# Patient Record
Sex: FEMALE | Race: WHITE | NOT HISPANIC OR LATINO | Employment: OTHER | URBAN - METROPOLITAN AREA
[De-identification: names, ages, dates, MRNs, and addresses within clinical notes are randomized per-mention and may not be internally consistent; named-entity substitution may affect disease eponyms.]

---

## 2017-09-28 ENCOUNTER — TELEPHONE (OUTPATIENT)
Dept: NEUROLOGY | Facility: MEDICAL CENTER | Age: 62
End: 2017-09-28

## 2017-09-28 NOTE — TELEPHONE ENCOUNTER
Called in 3 months worth of:    levetiracetam (KEPPRA) 500 MG Tab 90 Tab   Sig: Take one tablets PO qam and two tablets po qhs.    To cover patient up until her scheduled follow up appointment 12/20/17.

## 2017-12-06 ENCOUNTER — TELEPHONE (OUTPATIENT)
Dept: NEUROLOGY | Facility: MEDICAL CENTER | Age: 62
End: 2017-12-06

## 2017-12-06 NOTE — TELEPHONE ENCOUNTER
Patient called to report a seizure.      When change was noticed- Tuesday Afternoon   Frequency- 1  Type of seizure- Grand Mal    Current Medications- Keppra 500mg -1000mg      Any Medication changes?- No changes, however patient thinks that she might have been taking 500mg BID instead of the 1000mg at bedtime.     Illness/Fever? n   Menses? n  Stress? Lost dog a week ago  Sleep Deprivation? Going to bed late, like 1 to 2 am and then waking up at 7 am   Driving? no    Caller: Beatrice  Call Back #: [phone]  OK to leave detailed message: n

## 2017-12-20 ENCOUNTER — APPOINTMENT (OUTPATIENT)
Dept: NEUROLOGY | Facility: MEDICAL CENTER | Age: 62
End: 2017-12-20
Payer: MEDICAID

## 2018-01-16 DIAGNOSIS — G40.909 SEIZURE DISORDER (HCC): ICD-10-CM

## 2018-01-16 NOTE — TELEPHONE ENCOUNTER
Was the patient seen in the last year in this department? No patient was last seen 9/13/16    Does patient have an active prescription for medications requested? Yes     Received Request Via: Pharmacy    Next scheduled follow up appointment: 2/6/18

## 2018-01-17 RX ORDER — LEVETIRACETAM 500 MG/1
TABLET ORAL
Qty: 90 TAB | Refills: 0 | Status: SHIPPED | OUTPATIENT
Start: 2018-01-17 | End: 2018-02-06 | Stop reason: SDUPTHER

## 2018-02-06 ENCOUNTER — OFFICE VISIT (OUTPATIENT)
Dept: NEUROLOGY | Facility: MEDICAL CENTER | Age: 63
End: 2018-02-06
Payer: MEDICAID

## 2018-02-06 VITALS
OXYGEN SATURATION: 97 % | TEMPERATURE: 98.2 F | HEIGHT: 65 IN | BODY MASS INDEX: 22.99 KG/M2 | RESPIRATION RATE: 16 BRPM | WEIGHT: 138 LBS | HEART RATE: 89 BPM | SYSTOLIC BLOOD PRESSURE: 128 MMHG | DIASTOLIC BLOOD PRESSURE: 82 MMHG

## 2018-02-06 DIAGNOSIS — R05.9 COUGH: ICD-10-CM

## 2018-02-06 DIAGNOSIS — R07.9 RIGHT-SIDED CHEST PAIN: ICD-10-CM

## 2018-02-06 DIAGNOSIS — G40.909 SEIZURE DISORDER (HCC): ICD-10-CM

## 2018-02-06 PROCEDURE — 99213 OFFICE O/P EST LOW 20 MIN: CPT | Performed by: NURSE PRACTITIONER

## 2018-02-06 RX ORDER — LEVETIRACETAM 500 MG/1
TABLET ORAL
Qty: 90 TAB | Refills: 5 | Status: SHIPPED | OUTPATIENT
Start: 2018-02-06 | End: 2018-08-10 | Stop reason: SDUPTHER

## 2018-02-06 ASSESSMENT — PATIENT HEALTH QUESTIONNAIRE - PHQ9: CLINICAL INTERPRETATION OF PHQ2 SCORE: 0

## 2018-02-06 NOTE — PROGRESS NOTES
"Subjective:      Beatrice Caraballo is a 62 y.o. female who presents with Follow-Up (Seizure disorder)          HPI  Did have a seizure a few months ago and was seen in the ER and diagnosed with pneumonia.  She had no idea that she had a pneumonia at the time of the seizure.  The seizure was \"a good one\".  She did fall and injured her right chest which is still uncomfortable.    Still complaining of right chest discomfort.  Has not see her PCP nor had a follow-up chest xray.  She is asking for pain medication today.    She does smoke marijuana.    Current Outpatient Prescriptions   Medication Sig Dispense Refill   • levetiracetam (KEPPRA) 500 MG Tab Take one tablets PO qam and two tablets po qhs. 90 Tab 0   • phenytoin ER (DILANTIN) 100 MG Cap 3 times a day.     • hydrocodone/acetaminophen (NORCO)  MG Tab Take 1 Tab by mouth every 8 hours as needed. (Patient not taking: Reported on 2/6/2018) 20 Tab 0     No current facility-administered medications for this visit.        Review of Systems   Constitutional: Negative.    HENT: Negative for hearing loss, nosebleeds and sore throat.         No recent head injury.   Eyes: Negative for double vision.        No new loss of vision.   Respiratory: Positive for cough.         No recent lung infections.   Cardiovascular: Negative for chest pain.   Gastrointestinal: Negative for abdominal pain, diarrhea, nausea and vomiting.   Genitourinary: Negative.    Musculoskeletal: Negative.    Skin: Negative.    Neurological: Negative for headaches.   Endo/Heme/Allergies:        No history of endocrine dysfunction.  No new problems.   Psychiatric/Behavioral: Positive for memory loss. Negative for depression. The patient is not nervous/anxious.         No recent mood changes.          Objective:     /82   Pulse 89   Temp 36.8 °C (98.2 °F)   Resp 16   Ht 1.651 m (5' 5\")   Wt 62.6 kg (138 lb)   SpO2 97%   BMI 22.96 kg/m²      Physical Exam   Constitutional: She is oriented to " person, place, and time. She appears well-developed and well-nourished. No distress.   Appears hagard   HENT:   Head: Normocephalic and atraumatic.   Eyes: Pupils are equal, round, and reactive to light.   Neck: Normal range of motion.   Cardiovascular: Normal rate and regular rhythm.    Pulmonary/Chest: Effort normal. No respiratory distress.   Cough     Musculoskeletal: Normal range of motion.   Neurological: She is alert and oriented to person, place, and time. She has normal strength and normal reflexes. No cranial nerve deficit. She exhibits normal muscle tone. Coordination normal.   No observable changes in neurologic status.  See initial new patient examination for details.       Skin: Skin is warm and dry. There is pallor.   Psychiatric: She has a normal mood and affect. Her speech is tangential.               Assessment/Plan:     Seizure disorder, unclear type:  20 year history of sporadic events concerning for seizures and poor management and compliance of AED's.  Last known seizure was in November when also diagnosed with pneumonia.    Has been doing well off of phenytoin and only taking Keppra.  Currently taking LEV 500mg-1000mg.     Obtain REEG to evaluate for subclinical seizures.  Order placed once again.     Needs to take MVI and Calcium 2000mg per day.     Discussed general safety including cooking and water safety.   Request that a family member or friend attend the next appointment with her.     She does not have a rescue or emergent medication.    Return for follow-up after EEG to discuss plan further.       EDUCATION AND COUNSELING:  -Education was provided to the patient and/or family regarding diagnosis and prognosis. The chronic and unpredictable nature of the condition was discussed. There is increased risk for additional events, which may carry potential for significant injuries and death.    -We reviewed the current antiepileptic regimen. Potential side effects of antiepileptics were  discussed at length, including but no limited to: hypersensitivity reactions (rash and others, some of which can be fatal), visual field changes (some of which may be irreversible), glaucoma, diplopia, kidney stones, osteopenia/osteoporosis/bone fractures, hyperthermia/anhydrosis, tremors, ataxia, dizziness, fatigue, increased risk for falls, cardiac arrhythmias/syncope, gastrointestinal (hepatitis, pancreatitis, gastritis, ulcers), gingival hypertrophy, drowsiness, sedation, anxiety/nervousness, increased risk for suicide, increased risk for depression, and psychosis. We reviewed drug-drug interactions and their potential effect on seizure control and medication side effects.    -Patient/family educated on SUDEP (Sudden Death in Epilepsy). Counseling was provided on the importance of strict medication and follow up compliance. The patient understands the risks associated with non-adherence with the medical plan as outlined, including but not limited to an increased risk for breakthrough seizures, which may contribute to injuries, disability, status epilepticus, and even death.    -Counseling was also provided on potential effects of alcohol and other drugs, which may lower seizure threshold and/or affect the metabolism of antiepileptic drugs. I recommend avoidance of alcohol and illegal drugs.  -Recommend proper hydration, regular exercise, proper sleep hygiene (7-8 hrs of overnight sleep, avoid sleep deprivation).    -I have made the patient aware of mandatory reporting required by the law in the State of [place] regarding episodes of seizures, loss of consciousness, and/or alteration of awareness. The patient and family are responsible for reporting events to the DMV, instructions were provided. The patient verbalized understanding and states she has not been driving. Other seizure precautions were discussed at length, including no diving, no skydiving, no unsupervised swimming, no Jacuzzi or bathing in bathtubs.       Pt agrees with plan.

## 2018-02-08 PROBLEM — R05.9 COUGH: Status: ACTIVE | Noted: 2018-02-08

## 2018-02-08 PROBLEM — R07.9 RIGHT-SIDED CHEST PAIN: Status: ACTIVE | Noted: 2018-02-08

## 2018-02-08 ASSESSMENT — ENCOUNTER SYMPTOMS
NAUSEA: 0
CONSTITUTIONAL NEGATIVE: 1
DOUBLE VISION: 0
HEADACHES: 0
VOMITING: 0
MEMORY LOSS: 1
SORE THROAT: 0
ABDOMINAL PAIN: 0
COUGH: 1
MUSCULOSKELETAL NEGATIVE: 1
DIARRHEA: 0
DEPRESSION: 0
NERVOUS/ANXIOUS: 0

## 2018-08-10 DIAGNOSIS — G40.909 SEIZURE DISORDER (HCC): ICD-10-CM

## 2018-08-10 RX ORDER — LEVETIRACETAM 500 MG/1
TABLET ORAL
Qty: 90 TAB | Refills: 5 | Status: SHIPPED | OUTPATIENT
Start: 2018-08-10 | End: 2018-11-04

## 2018-08-10 NOTE — TELEPHONE ENCOUNTER
Was the patient seen in the last year in this department? Yes    Does patient have an active prescription for medications requested? No     Received Request Via: Patient     No next appt, No Show to EEG due to lack of transportation.

## 2018-11-04 ENCOUNTER — HOSPITAL ENCOUNTER (EMERGENCY)
Facility: MEDICAL CENTER | Age: 63
End: 2018-11-05
Attending: EMERGENCY MEDICINE
Payer: MEDICARE

## 2018-11-04 ENCOUNTER — APPOINTMENT (OUTPATIENT)
Dept: RADIOLOGY | Facility: MEDICAL CENTER | Age: 63
End: 2018-11-04
Attending: EMERGENCY MEDICINE
Payer: MEDICARE

## 2018-11-04 VITALS
TEMPERATURE: 98.1 F | WEIGHT: 155 LBS | BODY MASS INDEX: 26.46 KG/M2 | OXYGEN SATURATION: 93 % | RESPIRATION RATE: 16 BRPM | HEART RATE: 80 BPM | HEIGHT: 64 IN

## 2018-11-04 DIAGNOSIS — S00.431A HEMATOMA OF RIGHT EAR, INITIAL ENCOUNTER: ICD-10-CM

## 2018-11-04 DIAGNOSIS — H60.11 CELLULITIS OF RIGHT EAR: ICD-10-CM

## 2018-11-04 LAB
ANION GAP SERPL CALC-SCNC: 7 MMOL/L (ref 0–11.9)
BASOPHILS # BLD AUTO: 1 % (ref 0–1.8)
BASOPHILS # BLD: 0.07 K/UL (ref 0–0.12)
BUN SERPL-MCNC: 15 MG/DL (ref 8–22)
CALCIUM SERPL-MCNC: 8.8 MG/DL (ref 8.5–10.5)
CHLORIDE SERPL-SCNC: 111 MMOL/L (ref 96–112)
CO2 SERPL-SCNC: 24 MMOL/L (ref 20–33)
CREAT SERPL-MCNC: 0.6 MG/DL (ref 0.5–1.4)
EOSINOPHIL # BLD AUTO: 0.2 K/UL (ref 0–0.51)
EOSINOPHIL NFR BLD: 3 % (ref 0–6.9)
ERYTHROCYTE [DISTWIDTH] IN BLOOD BY AUTOMATED COUNT: 50.5 FL (ref 35.9–50)
GLUCOSE SERPL-MCNC: 72 MG/DL (ref 65–99)
HCT VFR BLD AUTO: 43.5 % (ref 37–47)
HGB BLD-MCNC: 14.2 G/DL (ref 12–16)
IMM GRANULOCYTES # BLD AUTO: 0.02 K/UL (ref 0–0.11)
IMM GRANULOCYTES NFR BLD AUTO: 0.3 % (ref 0–0.9)
LYMPHOCYTES # BLD AUTO: 2.56 K/UL (ref 1–4.8)
LYMPHOCYTES NFR BLD: 38.3 % (ref 22–41)
MCH RBC QN AUTO: 33.6 PG (ref 27–33)
MCHC RBC AUTO-ENTMCNC: 32.6 G/DL (ref 33.6–35)
MCV RBC AUTO: 103.1 FL (ref 81.4–97.8)
MONOCYTES # BLD AUTO: 0.59 K/UL (ref 0–0.85)
MONOCYTES NFR BLD AUTO: 8.8 % (ref 0–13.4)
NEUTROPHILS # BLD AUTO: 3.25 K/UL (ref 2–7.15)
NEUTROPHILS NFR BLD: 48.6 % (ref 44–72)
NRBC # BLD AUTO: 0 K/UL
NRBC BLD-RTO: 0 /100 WBC
PLATELET # BLD AUTO: 256 K/UL (ref 164–446)
PMV BLD AUTO: 9.7 FL (ref 9–12.9)
POTASSIUM SERPL-SCNC: 3.9 MMOL/L (ref 3.6–5.5)
RBC # BLD AUTO: 4.22 M/UL (ref 4.2–5.4)
SODIUM SERPL-SCNC: 142 MMOL/L (ref 135–145)
WBC # BLD AUTO: 6.7 K/UL (ref 4.8–10.8)

## 2018-11-04 PROCEDURE — 700117 HCHG RX CONTRAST REV CODE 255: Performed by: EMERGENCY MEDICINE

## 2018-11-04 PROCEDURE — 304561 HCHG STAT O2

## 2018-11-04 PROCEDURE — 99284 EMERGENCY DEPT VISIT MOD MDM: CPT

## 2018-11-04 PROCEDURE — 85025 COMPLETE CBC W/AUTO DIFF WBC: CPT

## 2018-11-04 PROCEDURE — 80048 BASIC METABOLIC PNL TOTAL CA: CPT

## 2018-11-04 PROCEDURE — 36415 COLL VENOUS BLD VENIPUNCTURE: CPT

## 2018-11-04 PROCEDURE — 70487 CT MAXILLOFACIAL W/DYE: CPT

## 2018-11-04 RX ORDER — SULFAMETHOXAZOLE AND TRIMETHOPRIM 800; 160 MG/1; MG/1
1 TABLET ORAL 2 TIMES DAILY
Qty: 14 TAB | Refills: 0 | Status: SHIPPED | OUTPATIENT
Start: 2018-11-04 | End: 2018-11-11

## 2018-11-04 RX ORDER — CEPHALEXIN 250 MG/1
250 CAPSULE ORAL 4 TIMES DAILY
Qty: 28 CAP | Refills: 0 | Status: SHIPPED | OUTPATIENT
Start: 2018-11-04 | End: 2018-11-11

## 2018-11-04 RX ORDER — CARBAMAZEPINE 200 MG/1
200 TABLET ORAL 3 TIMES DAILY
COMMUNITY
End: 2019-03-20

## 2018-11-04 RX ADMIN — IOHEXOL 75 ML: 350 INJECTION, SOLUTION INTRAVENOUS at 23:13

## 2018-11-04 ASSESSMENT — PAIN SCALES - GENERAL: PAINLEVEL_OUTOF10: 2

## 2018-11-04 ASSESSMENT — LIFESTYLE VARIABLES: DO YOU DRINK ALCOHOL: YES

## 2018-11-05 NOTE — ED PROVIDER NOTES
ED Provider Note    Scribed for Smita Brower M.D. by Singh Love. 11/4/2018, 8:54 PM.    Primary care provider: Akash Vilchis M.D.  Means of arrival: Ambulance  History obtained from: Patient  History limited by: None    CHIEF COMPLAINT  Chief Complaint   Patient presents with   • Foreign Body in Ear     Pt reports having a seizure about 4 days ago, leading to her hitting her ear. Pt noticed blood coming out of her ear. In an effort to clean the blood, the patient used peroxide and a Qtip. She states her ear started going numb and in an effort to remove the Qtip, she hit and lodged the qtip in her ear.    • Sent by MD     Pt transferred from [facility] in [place] for evaluation       HPI  Beatrice Caraballo is a 63 y.o. female who presents to the Emergency Department for infection of the right ear and possible foreign body as a transfer. The patient had a seizure 4 days ago and she hit her ear on a garage lid, leading to an ear laceration. She did not follow up with anyone for laceration repair, but she washed it out. The patient was then cleaning out her ear with hydrogen peroxide and used a Q-Tip, when she accidentally hit the Q-Tip  -she believes there is concerned that it actually went in to her ear. She reports that she did not pull the Q-Tip out and it is still in there. She was evaluated at another hospital and the physician could not visualize the foreign body. She was transferred for cellulitis of the ear that may need to be drained. She was given Vancomycin and Ancef prior to transfer and no imaging was performed. She denies any fevers, nausea, and emesis. The patient too, Aspirin this morning.    REVIEW OF SYSTEMS  Positives as above. Pertinent negatives include fevers, nausea, and emesis.   All other review of systems are negative    PAST MEDICAL HISTORY   has a past medical history of Seizure (HCC) and Seizure (HCC).    SURGICAL HISTORY   has a past surgical history that includes athroplasty.    SOCIAL  "HISTORY  Social History   Substance Use Topics   • Smoking status: Current Every Day Smoker     Packs/day: 0.25     Types: Cigarettes   • Smokeless tobacco: Never Used      Comment: Patient states a pack lasts her 3 to 4 days       FAMILY HISTORY  Family History   Problem Relation Age of Onset   • Cancer Sister        CURRENT MEDICATIONS  Reviewed. See Encounter Summary.     ALLERGIES  No Known Allergies    PHYSICAL EXAM  VITAL SIGNS: Temp 36.7 °C (98.1 °F)   Ht 1.626 m (5' 4\")   Wt 70.3 kg (155 lb)   BMI 26.61 kg/m²    Pulse ox interpretation: I interpret this pulse ox as normal.  Constitutional: Alert in no apparent distress.  HENT: Normocephalic, MMM, Right ear antihelix and inner part is swollen and eyrthematous with hematoma, 2 lacerations on the lower inner part on cartilage near pinna that come together in T that are 2 and 3.5 cm, her TM is erythematous and bulging with fluid, canal swollen, no mastoid tend no evidence of foreign body  Eyes: PERR, Conjunctiva normal, Non-icteric.   Neck: Normal range of motion, No tenderness, Supple, No stridor.   Lymphatic: No lymphadenopathy noted.   Cardiovascular: Regular rate and rhythm, no murmurs.   Thorax & Lungs: Normal breath sounds, No respiratory distress, No wheezing, No chest tenderness.   Abdomen: Bowel sounds normal, Soft, No tenderness, No pulsatile masses. No peritoneal signs.  Skin: Warm, Dry, No erythema, No rash.   Back: No bony tenderness, No CVA tenderness.   Extremities: Intact distal pulses, No edema, No tenderness, No cyanosis  Musculoskeletal: Good range of motion in all major joints. No tenderness to palpation or major deformities noted.   Neurologic: Alert and oriented, No focal deficits noted.     DIFFERENTIAL DIAGNOSIS AND WORK UP PLAN    8:54 PM Patient seen and examined at bedside. The patient presents with foreign body in right ear and the differential diagnosis includes but is not limited to Ear cellulitis with underlying hematoma, otitis " "media with possible otitis external. Ordered for CBC and BMP to evaluate. I informed the patient that I will consult ENT and she will likely undergo a CT scan.    DIAGNOSTIC STUDIES / PROCEDURES     LABS  CBC BMP within normal limits  All labs were reviewed by me.\    RADIOLOGY  CT-MAXILLOFACIAL WITH PLUS RECONS   Final Result      1.  Soft tissue edema involving the right ear. No abscess is identified.      2.  No mastoid effusion or bony destruction to suggest mastoiditis.        The radiologist's interpretation of all radiological studies have been reviewed by me.    COURSE & MEDICAL DECISION MAKING  Pertinent Labs & Imaging studies reviewed. (See chart for details)    9:03 PM I reviewed the patient's outside records and it was found as follows: CBC 6 without a left shift, CMP normal, updated on tetanus, and given Vancomycin and Ancef.    9:10 ENT paged.    9:43 PM ENT re-paged.    10:09 PM Dr. Fuller, ENT, consulted and informe [facility] that she looks fine and needs follow up. He agrees and would like her placed on antibiotics, along with follow up.     10:14 PM I reevaluated the patient and she feels like there is something there, so she will undergo a CT scan.    10:15 PM CT-Maxillofacial ordered for evaluation.    11:49 PM I reevaluated the patient and she is doing well. I informed her of her imaging results and that she will be discharged home.  Patient will be started on something to cover MRSA as discussed with Dr. Fuller.  She will be sent home with Bactrim and Keflex and continue to take the antibiotics until they are completed she will follow-up with ENT later this week and she will call their office this morning.  The patient is advised to follow up with ENT and to return for further evaluation should the patent's symptoms worsen. The patient understands and agrees to discharge home.    131/73  Pulse 79   Temp 36.7 °C (98.1 °F)   Resp 14   Ht 1.626 m (5' 4\")   Wt 70.3 kg (155 lb)   SpO2 94%   BMI " 26.61 kg/m²      The patient will return for new or worsening symptoms and is stable at the time of discharge.    DISPOSITION:  Patient will be discharged home in stable condition.    FOLLOW UP:  Akash Vilchis M.D.  [address] [facility] [address]  [phone]    Schedule an appointment as soon as possible for a visit       [facility], Emergency Dept  [address] [facility] [address] [address] [phone]    If symptoms worsen    Sen Fuller M.D.  [address] [facility] [address]  [phone]    Schedule an appointment as soon as possible for a visit   CALL TODAY TO SCHEDULE A FOLLOW UP APPT      OUTPATIENT MEDICATIONS:  New Prescriptions    CEPHALEXIN (KEFLEX) 250 MG CAP    Take 1 Cap by mouth 4 times a day for 7 days.    SULFAMETHOXAZOLE-TRIMETHOPRIM (BACTRIM DS) 800-160 MG TABLET    Take 1 Tab by mouth 2 times a day for 7 days.         FINAL IMPRESSION  1. Cellulitis of right ear    2. Hematoma of right ear, initial encounter          Singh ANG (Scribe), am scribing for, and in the presence of, Smita Brower M.D..    Electronically signed by: Singh Love ([place]), 11/4/2018    ISmita M.D. personally performed the services described in this documentation, as scribed by Singh Love in my presence, and it is both accurate and complete. C    The note accurately reflects work and decisions made by me.  Smita Brower  11/5/2018  12:59 AM    This dictation has been created using voice recognition software and/or scribes. The accuracy of the dictation is limited by the abilities of the software and the expertise of the scribes. I expect there may be some errors of grammar and possibly content. I made every attempt to manually correct the errors within my dictation. However, errors related to voice recognition software and/or scribes may still exist and should be interpreted within the appropriate context.

## 2018-11-05 NOTE — ED TRIAGE NOTES
"Beatrice Caraballo  63 y.o. Female  Chief Complaint   Patient presents with   • Foreign Body in Ear     Pt reports having a seizure about 4 days ago, leading to her hitting her ear. Pt noticed blood coming out of her ear. In an effort to clean the blood, the patient used peroxide and a Qtip. She states her ear started going numb and in an effort to remove the Qtip, she hit and lodged the qtip in her ear.    • Sent by MD     Pt transferred from [facility] for evaluation     Pt transferred from [facility] by EMS. Pt given Vancomycin, ancef, 4mg Zofran, 4mg Morphine, and Toradol at outlying facility.   Pt is alert and oriented, speaking in full sentences, follows commands and responds appropriately to questions. NAD. Resp are even and unlabored.     Hx: Tobacco abuse, Seizure    NIBP: 120/82, NIBP MAP (Calculated): 113, Heart Rate (Monitored): 80, Pulse: 88, Respiration: 18, Temperature: 36.7 °C (98.1 °F), Height: 162.6 cm (5' 4\"), Weight: 70.3 kg (155 lb), BMI (Calculated): 26.61, BSA (Calculated): 1.8, Pulse Oximetry: 90 %, O2 (LPM): 0, O2 Delivery: None (Room Air)  "

## 2018-11-05 NOTE — ED NOTES
D/C instructions provided to patient at bedside, verbalizes understanding and states plans for follow-up with otolaryngology as recommended.   Scripts given  IV removed and dressed.   All belongings accounted for, all questions answered at this time.   Pt provided MTM information and assisted to lobby.

## 2019-01-29 ENCOUNTER — TELEPHONE (OUTPATIENT)
Dept: NEUROLOGY | Facility: MEDICAL CENTER | Age: 64
End: 2019-01-29

## 2019-01-31 ENCOUNTER — TELEPHONE (OUTPATIENT)
Dept: NEUROLOGY | Facility: MEDICAL CENTER | Age: 64
End: 2019-01-31

## 2019-01-31 DIAGNOSIS — G40.909 SEIZURE DISORDER (HCC): ICD-10-CM

## 2019-01-31 NOTE — TELEPHONE ENCOUNTER
Pt called and let me know that she would like to be referred to [facility] because it is closer to her home and her  is now going there, she thinks it will be easier for her to go there instead. I let pt know that I will call her back if Elda approved to refer her of if she wants pt to obtain from PCP.

## 2019-03-19 ENCOUNTER — OFFICE VISIT (OUTPATIENT)
Dept: NEUROLOGY | Facility: MEDICAL CENTER | Age: 64
End: 2019-03-19
Payer: MEDICARE

## 2019-03-19 VITALS
HEIGHT: 64 IN | OXYGEN SATURATION: 94 % | HEART RATE: 87 BPM | WEIGHT: 162.4 LBS | TEMPERATURE: 97.9 F | BODY MASS INDEX: 27.72 KG/M2 | SYSTOLIC BLOOD PRESSURE: 122 MMHG | DIASTOLIC BLOOD PRESSURE: 66 MMHG

## 2019-03-19 DIAGNOSIS — G40.909 SEIZURE DISORDER (HCC): ICD-10-CM

## 2019-03-19 DIAGNOSIS — S09.93XA TONGUE INJURY, INITIAL ENCOUNTER: ICD-10-CM

## 2019-03-19 PROCEDURE — 99213 OFFICE O/P EST LOW 20 MIN: CPT | Performed by: NURSE PRACTITIONER

## 2019-03-19 RX ORDER — LEVETIRACETAM 500 MG/1
TABLET ORAL
Qty: 120 TAB | Refills: 5 | Status: SHIPPED | OUTPATIENT
Start: 2019-03-19 | End: 2019-09-06 | Stop reason: SDUPTHER

## 2019-03-19 ASSESSMENT — PATIENT HEALTH QUESTIONNAIRE - PHQ9: CLINICAL INTERPRETATION OF PHQ2 SCORE: 0

## 2019-03-19 NOTE — PROGRESS NOTES
Subjective:      Beatrice Caraballo is a 64 y.o. female who presents with No chief complaint on file.          HPI Did have a seizure on Saturday night during her sleep.  She awoke with extreme leg soreness, having bit both sides of her tongue and post-ictal fatigue.  Her tongue is very injured.    Intercurrently, she fell and hit her right ear requiring a trip to the ED.    Ran out of Keppra about 5-6 days ago and this is attributed to her having the seizure on Saturday.     She does smoke marijuana.    Current Outpatient Prescriptions   Medication Sig Dispense Refill   • levETIRAcetam (KEPPRA) 500 MG Tab TAKE ONE TABLET BY MOUTH IN THE MORNING AND 2 TABLETS BY MOUTH AT BEDTIME 45 Tab 0   • carBAMazepine (TEGRETOL) 200 MG Tab Take 200 mg by mouth 3 times a day.       No current facility-administered medications for this visit.          Review of Systems   HENT: Negative for hearing loss, nosebleeds and sore throat.         No recent head injury.   Eyes: Negative for double vision.        No new loss of vision.   Respiratory: Positive for cough ( tongue injury).         No recent lung infections.   Cardiovascular: Negative for chest pain.   Gastrointestinal: Negative for abdominal pain, diarrhea, nausea and vomiting.   Genitourinary: Negative.    Musculoskeletal: Negative.    Skin: Negative.    Neurological: Positive for seizures. Negative for headaches.   Endo/Heme/Allergies:        No history of endocrine dysfunction.  No new problems.   Psychiatric/Behavioral: Positive for memory loss. Negative for depression. The patient is not nervous/anxious.         No recent mood changes.          Objective:     There were no vitals taken for this visit.     Physical Exam   Constitutional: She is oriented to person, place, and time. She appears well-developed and well-nourished.   Appears tired, difficult time talking due to the tongue injury   HENT:   Head: Normocephalic and atraumatic.   Eyes: EOM are normal.   Neck: Normal range  of motion.   Cardiovascular: Normal rate and regular rhythm.    Pulmonary/Chest: Effort normal.   Neurological: She is alert and oriented to person, place, and time. She exhibits normal muscle tone. Gait normal.   No observable changes in neurologic status.  See initial new patient examination for details.    Skin: Skin is warm. There is pallor.   Psychiatric: She has a normal mood and affect.             Assessment/Plan:     Seizure disorder, unclear type:  20 year history of sporadic events concerning for seizures and poor management and compliance of AED's.  Last known seizure was on Saturday.  She has been out of Keppra for 5-6 days.     Has been doing well off of phenytoin and only taking Keppra.  Currently taking LEV 500mg-1000mg.  Will submit a new Rx for titration of Keppra to 1000mg BID.     Obtain REEG to evaluate for subclinical seizures.  Order placed once again.     Needs to take MVI and Calcium 2000mg per day.     Discussed general safety including cooking and water safety.   Request that a family member or friend attend the next appointment with her.     She does not have a rescue or emergent medication.     New Rx for BMX mouth wash provided.    Return for follow-up after EEG to discuss plan further.        EDUCATION AND COUNSELING:  -Education was provided to the patient and/or family regarding diagnosis and prognosis. The chronic and unpredictable nature of the condition was discussed. There is increased risk for additional events, which may carry potential for significant injuries and death.    -We reviewed the current antiepileptic regimen. Potential side effects of antiepileptics were discussed at length, including but no limited to: hypersensitivity reactions (rash and others, some of which can be fatal), visual field changes (some of which may be irreversible), glaucoma, diplopia, kidney stones, osteopenia/osteoporosis/bone fractures, hyperthermia/anhydrosis, tremors, ataxia, dizziness,  fatigue, increased risk for falls, cardiac arrhythmias/syncope, gastrointestinal (hepatitis, pancreatitis, gastritis, ulcers), gingival hypertrophy, drowsiness, sedation, anxiety/nervousness, increased risk for suicide, increased risk for depression, and psychosis. We reviewed drug-drug interactions and their potential effect on seizure control and medication side effects.    -Patient/family educated on SUDEP (Sudden Death in Epilepsy). Counseling was provided on the importance of strict medication and follow up compliance. The patient understands the risks associated with non-adherence with the medical plan as outlined, including but not limited to an increased risk for breakthrough seizures, which may contribute to injuries, disability, status epilepticus, and even death.    -Counseling was also provided on potential effects of alcohol and other drugs, which may lower seizure threshold and/or affect the metabolism of antiepileptic drugs. I recommend avoidance of alcohol and illegal drugs.  -Recommend proper hydration, regular exercise, proper sleep hygiene (7-8 hrs of overnight sleep, avoid sleep deprivation).    -I have made the patient aware of mandatory reporting required by the law in the [place] regarding episodes of seizures, loss of consciousness, and/or alteration of awareness. The patient and family are responsible for reporting events to the DMV, instructions were provided. The patient verbalized understanding and states she has not been driving. Other seizure precautions were discussed at length, including no diving, no skydiving, no unsupervised swimming, no Jacuzzi or bathing in bathtubs.       Pt agrees with plan.

## 2019-03-20 ASSESSMENT — ENCOUNTER SYMPTOMS
DIARRHEA: 0
DOUBLE VISION: 0
ABDOMINAL PAIN: 0
NAUSEA: 0
MEMORY LOSS: 1
COUGH: 1
VOMITING: 0
HEADACHES: 0
NERVOUS/ANXIOUS: 0
SEIZURES: 1
DEPRESSION: 0
MUSCULOSKELETAL NEGATIVE: 1
SORE THROAT: 0

## 2019-08-07 ENCOUNTER — NON-PROVIDER VISIT (OUTPATIENT)
Dept: NEUROLOGY | Facility: MEDICAL CENTER | Age: 64
End: 2019-08-07
Payer: MEDICARE

## 2019-08-07 DIAGNOSIS — G40.909 SEIZURE DISORDER (HCC): ICD-10-CM

## 2019-08-07 PROCEDURE — 95816 EEG AWAKE AND DROWSY: CPT | Performed by: PSYCHIATRY & NEUROLOGY

## 2019-08-07 NOTE — PROCEDURES
ROUTINE ELECTROENCEPHALOGRAM REPORT      Referring provider: FRANCO Paez.    DOS: 8/7/2019 (total recording of 27 minutes)     INDICATION:  Beatrice Caraballo 64 y.o. female presenting with seizures.     CURRENT ANTIEPILEPTIC REGIMEN: Levetiracetam.    TECHNIQUE: 30 channel routine electroencephalogram (EEG) was performed in accordance with the international 10-20 system. The study was reviewed in bipolar and referential montages. The recording examined the patient during wakeful and drowsy state(s).     DESCRIPTION OF THE RECORD:  During the wakefulness, the background showed a symmetrical 9 hz alpha activity posteriorly with amplitude of 70 mV.  There was reactivity to eye closure/opening.  A normal anterior-posterior gradient was noted with faster beta frequencies seen anteriorly.  During drowsiness, thetafrequencies were seen.    ACTIVATION PROCEDURES:   Hyperventilation was performed by the patient for a total of 3 minutes. The technician performing the test noted good effort. This technique failed to produce any significant electroencephalographic changes.     Intermittent Photic stimulation was performed in a stepwise fashion from 1 to 30 Hz and elicited a normal response (photic driving), most noticeable in the posterior leads.      ICTAL AND/OR INTERICTAL FINDINGS:   A single burst of generalized, 4 to 5 Hz spike and slow-wave complexes was captured during the study.  This was about 5 seconds in duration.  No clinical events or seizures were reported or recorded during the study.     EKG: sampling of the EKG recording demonstrated sinus rhythm.       INTERPRETATION:  This is an abnormal routine EEG recording in the awake and drowsy states. A single burst of generalized, 4 to 5 Hz spike and slow-wave complexes was captured during the study.  This was about 5 seconds in duration.  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  Clinical correlation  is recommended.      Eulogio Valles MD   Epilepsy and Neurodiagnostics.   Clinical  of Neurology [facility].   Diplomate in Neurology, Epilepsy, and Electrodiagnostic Medicine.   Office: [phone]  Fax: [phone]

## 2019-08-13 ENCOUNTER — TELEPHONE (OUTPATIENT)
Dept: NEUROLOGY | Facility: MEDICAL CENTER | Age: 64
End: 2019-08-13

## 2019-08-13 NOTE — TELEPHONE ENCOUNTER
I just reviewed her EEG and I'd like to talk to her about it in an upcoming appt.  I ordered labs to be collected-- can we please remind her to have a lab draw, fasting and trough?

## 2019-08-16 NOTE — TELEPHONE ENCOUNTER
Called pt and LVM advising results and asking for labs to be done fasting and trough. Advised to call back with questions or concerns.

## 2019-09-23 ENCOUNTER — OFFICE VISIT (OUTPATIENT)
Dept: NEUROLOGY | Facility: MEDICAL CENTER | Age: 64
End: 2019-09-23
Payer: MEDICARE

## 2019-09-23 VITALS
HEIGHT: 64 IN | SYSTOLIC BLOOD PRESSURE: 124 MMHG | OXYGEN SATURATION: 98 % | BODY MASS INDEX: 28.17 KG/M2 | RESPIRATION RATE: 16 BRPM | TEMPERATURE: 97.2 F | DIASTOLIC BLOOD PRESSURE: 70 MMHG | WEIGHT: 165 LBS | HEART RATE: 75 BPM

## 2019-09-23 DIAGNOSIS — G40.909 SEIZURE DISORDER (HCC): ICD-10-CM

## 2019-09-23 PROCEDURE — 99213 OFFICE O/P EST LOW 20 MIN: CPT | Performed by: NURSE PRACTITIONER

## 2019-09-23 RX ORDER — LEVETIRACETAM 500 MG/1
TABLET ORAL
Qty: 360 TAB | Refills: 1 | Status: SHIPPED | OUTPATIENT
Start: 2019-09-23 | End: 2020-04-07

## 2019-09-23 ASSESSMENT — ENCOUNTER SYMPTOMS
MUSCULOSKELETAL NEGATIVE: 1
MEMORY LOSS: 1
NAUSEA: 0
DEPRESSION: 0
ABDOMINAL PAIN: 0
CONSTITUTIONAL NEGATIVE: 1
VOMITING: 0
SORE THROAT: 0
DIARRHEA: 0
SEIZURES: 0
COUGH: 0
NERVOUS/ANXIOUS: 0
DOUBLE VISION: 0
HEADACHES: 0

## 2019-09-23 NOTE — PROGRESS NOTES
Subjective:      Beatrice Caraballo is a 64 y.o. female who presents with Follow-Up (Seizure disorder )            HPI  She increased the Keppra from 500-1000mg to 1000mg BID.  She has not had another seizure.  Reports that she is feeling well and thinks that this is the right dose for her.    Tried and failed dilantin in years past.    Has had a breakthrough seizure when skipping 5-6 days of Keppra last year.    Her  has been in a rehab facility for 6 months now which has been very taxing for them.  She is by herself most of the time.      8/7/2019 EEG ICTAL AND/OR INTERICTAL FINDINGS:   A single burst of generalized, 4 to 5 Hz spike and slow-wave   complexes was captured during the study.  This was about 5   seconds in duration.  No clinical events or seizures were   reported or recorded during the study.     She does smoke marijuana.  Current Outpatient Medications   Medication Sig Dispense Refill   • levETIRAcetam (KEPPRA) 500 MG Tab TAKE TWO TABLETS BY MOUTH TWO TIMES A  Tab 0   • mag hydrox-al hydrox-simeth-diphenhydrAMINE-lidocaine viscous 2% Take 30 mL by mouth 4 times a day as needed. 250 mL 1     No current facility-administered medications for this visit.        Review of Systems   Constitutional: Negative.    HENT: Negative for hearing loss, nosebleeds and sore throat.         No recent head injury.   Eyes: Negative for double vision.        No new loss of vision.   Respiratory: Negative for cough.         No recent lung infections.   Cardiovascular: Negative for chest pain.   Gastrointestinal: Negative for abdominal pain, diarrhea, nausea and vomiting.   Genitourinary: Negative.    Musculoskeletal: Negative.    Skin: Negative.    Neurological: Negative for seizures and headaches.   Endo/Heme/Allergies:        No history of endocrine dysfunction.  No new problems.   Psychiatric/Behavioral: Positive for memory loss. Negative for depression. The patient is not nervous/anxious.         No recent mood  "changes.          Objective:     /70 (BP Location: Left arm, Patient Position: Sitting, BP Cuff Size: Adult)   Pulse 75   Temp 36.2 °C (97.2 °F) (Temporal)   Resp 16   Ht 1.626 m (5' 4.02\")   Wt 74.8 kg (165 lb)   SpO2 98%   BMI 28.31 kg/m²      Physical Exam   Constitutional: She is oriented to person, place, and time. She appears well-developed and well-nourished. No distress.   HENT:   Head: Normocephalic and atraumatic.   Right lower tooth is displaced and at an awkward position.     Eyes: EOM are normal.   Neck: Normal range of motion.   Cardiovascular: Normal rate and regular rhythm.   Pulmonary/Chest: Effort normal and breath sounds normal. No respiratory distress.   Musculoskeletal: Normal range of motion.   Neurological: She is alert and oriented to person, place, and time. She has normal strength and normal reflexes. No cranial nerve deficit. She exhibits normal muscle tone. Coordination normal.   No observable changes in neurologic status.  See initial new patient examination for details.     Skin: Skin is warm and dry.           Assessment/Plan:     Seizure disorder, unclear type:  20 year history of sporadic events concerning for seizures and poor management and compliance of AED's.  Last known seizure was mid-March 2019.    Increased Keppra to 1000mg BID and reports no seizures since that time.    8/7/2019 EEG ICTAL AND/OR INTERICTAL FINDINGS:   A single burst of generalized, 4 to 5 Hz spike and slow-wave   complexes was captured during the study.  This was about 5   seconds in duration.  No clinical events or seizures were   reported or recorded during the study.      Continue leviteracetam 1000mg BID.     Needs to take MVI and Calcium 2000mg per day.     Discussed general safety including cooking and water safety.   Request that a family member or friend attend the next appointment with her.     She does not have a rescue or emergent medication.      Return for follow-up in 4 months per " request as she has a long drive.        EDUCATION AND COUNSELING:  -Education was provided to the patient and/or family regarding diagnosis and prognosis. The chronic and unpredictable nature of the condition was discussed. There is increased risk for additional events, which may carry potential for significant injuries and death.    -We reviewed the current antiepileptic regimen. Potential side effects of antiepileptics were discussed at length, including but no limited to: hypersensitivity reactions (rash and others, some of which can be fatal), visual field changes (some of which may be irreversible), glaucoma, diplopia, kidney stones, osteopenia/osteoporosis/bone fractures, hyperthermia/anhydrosis, tremors, ataxia, dizziness, fatigue, increased risk for falls, cardiac arrhythmias/syncope, gastrointestinal (hepatitis, pancreatitis, gastritis, ulcers), gingival hypertrophy, drowsiness, sedation, anxiety/nervousness, increased risk for suicide, increased risk for depression, and psychosis. We reviewed drug-drug interactions and their potential effect on seizure control and medication side effects.    -Patient/family educated on SUDEP (Sudden Death in Epilepsy). Counseling was provided on the importance of strict medication and follow up compliance. The patient understands the risks associated with non-adherence with the medical plan as outlined, including but not limited to an increased risk for breakthrough seizures, which may contribute to injuries, disability, status epilepticus, and even death.    -Counseling was also provided on potential effects of alcohol and other drugs, which may lower seizure threshold and/or affect the metabolism of antiepileptic drugs. I recommend avoidance of alcohol and illegal drugs.  -Recommend proper hydration, regular exercise, proper sleep hygiene (7-8 hrs of overnight sleep, avoid sleep deprivation).    -I have made the patient aware of mandatory reporting required by the law in  the [place] of [place] regarding episodes of seizures, loss of consciousness, and/or alteration of awareness. The patient and family are responsible for reporting events to the DMV, instructions were provided. The patient verbalized understanding and states she has not been driving. Other seizure precautions were discussed at length, including no diving, no skydiving, no unsupervised swimming, no Jacuzzi or bathing in bathtubs.       Pt agrees with plan.

## 2020-04-07 ENCOUNTER — TELEPHONE (OUTPATIENT)
Dept: NEUROLOGY | Facility: MEDICAL CENTER | Age: 65
End: 2020-04-07

## 2020-07-14 ENCOUNTER — TELEPHONE (OUTPATIENT)
Dept: NEUROLOGY | Facility: MEDICAL CENTER | Age: 65
End: 2020-07-14

## 2020-09-06 ENCOUNTER — OFFICE VISIT (OUTPATIENT)
Dept: URGENT CARE | Facility: PHYSICIAN GROUP | Age: 65
End: 2020-09-06
Payer: MEDICARE

## 2020-09-06 ENCOUNTER — APPOINTMENT (OUTPATIENT)
Dept: RADIOLOGY | Facility: IMAGING CENTER | Age: 65
End: 2020-09-06
Attending: PHYSICIAN ASSISTANT
Payer: MEDICARE

## 2020-09-06 VITALS
RESPIRATION RATE: 16 BRPM | WEIGHT: 159 LBS | DIASTOLIC BLOOD PRESSURE: 82 MMHG | TEMPERATURE: 97.3 F | OXYGEN SATURATION: 97 % | HEIGHT: 66 IN | BODY MASS INDEX: 25.55 KG/M2 | HEART RATE: 94 BPM | SYSTOLIC BLOOD PRESSURE: 122 MMHG

## 2020-09-06 DIAGNOSIS — S63.501A SPRAIN OF RIGHT WRIST, INITIAL ENCOUNTER: ICD-10-CM
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
## 2020-09-06 DIAGNOSIS — S60.221A CONTUSION OF RIGHT HAND, INITIAL ENCOUNTER: ICD-10-CM

## 2020-09-06 DIAGNOSIS — L08.9 SKIN INFECTION: ICD-10-CM

## 2020-09-06 PROCEDURE — 73130 X-RAY EXAM OF HAND: CPT | Mod: TC,FY,RT | Performed by: PHYSICIAN ASSISTANT

## 2020-09-06 PROCEDURE — 73090 X-RAY EXAM OF FOREARM: CPT | Mod: TC,FY,RT | Performed by: PHYSICIAN ASSISTANT

## 2020-09-06 PROCEDURE — 99204 OFFICE O/P NEW MOD 45 MIN: CPT | Performed by: PHYSICIAN ASSISTANT

## 2020-09-06 RX ORDER — DOXYCYCLINE HYCLATE 100 MG
100 TABLET ORAL 2 TIMES DAILY
Qty: 14 TAB | Refills: 0 | Status: SHIPPED | OUTPATIENT
Start: 2020-09-06 | End: 2021-05-17

## 2020-09-06 RX ORDER — HYDROCODONE BITARTRATE AND ACETAMINOPHEN 5; 325 MG/1; MG/1
1 TABLET ORAL EVERY 6 HOURS PRN
Qty: 12 TAB | Refills: 0 | Status: SHIPPED | OUTPATIENT
Start: 2020-09-06 | End: 2020-09-09

## 2020-09-06 ASSESSMENT — PAIN SCALES - GENERAL: PAINLEVEL: 10=SEVERE PAIN

## 2020-09-06 NOTE — PROGRESS NOTES
"Chief Complaint   Patient presents with   • Hand Injury     fell and pt thinks she may have re-broke her arm       HISTORY OF PRESENT ILLNESS: Patient is a 65 y.o. female who presents today for the following:    Patient comes in for evaluation of a ground-level fall last night, tripping over a shop back at home.  She complains of right hand and wrist pain.  She has a history of a fracture in that wrist 2 years ago.  She did take some ibuprofen last night.  She denies distal paresthesias.    Patient Active Problem List    Diagnosis Date Noted   • Right-sided chest pain 2018   • Cough 2018   • Seizure disorder (HCC) 2016       Allergies:Patient has no known allergies.    Current Outpatient Medications Ordered in Epic   Medication Sig Dispense Refill   • doxycycline (VIBRAMYCIN) 100 MG Tab Take 1 Tab by mouth 2 times a day. 14 Tab 0   • HYDROcodone-acetaminophen (NORCO) 5-325 MG Tab per tablet Take 1 Tab by mouth every 6 hours as needed for up to 3 days. 12 Tab 0   • levETIRAcetam (KEPPRA) 500 MG Tab TAKE TWO TABLETS BY MOUTH TWO TIMES A  Tab 0   • mag hydrox-al hydrox-simeth-diphenhydrAMINE-lidocaine viscous 2% Take 30 mL by mouth 4 times a day as needed. 250 mL 1     No current [facility]-ordered facility-administered medications on file.        Past Medical History:   Diagnosis Date   • Seizure (HCC)    • Seizure (HCC)        Social History     Tobacco Use   • Smoking status: Current Every Day Smoker     Packs/day: 0.25     Types: Cigarettes   • Smokeless tobacco: Never Used   • Tobacco comment: Patient states a pack lasts her 3 to 4 days   Substance Use Topics   • Alcohol use: Yes     Comment: \"1-2 drinks/month\"   • Drug use: No       Family Status   Relation Name Status   • Mo  Alive   • Fa     • Sis     • Bro       Family History   Problem Relation Age of Onset   • Cancer Sister        Review of Systems:    Constitutional ROS: No unexpected change in weight, No weakness, " "No fatigue  Pulmonary ROS: No chronic cough, sputum, or hemoptysis, No dyspnea on exertion, No wheezing  Cardiovascular ROS: No diaphoresis, No edema, No palpitations  Musculoskeletal/Extremities ROS: Right hand and wrist pain.  Hematologic/Lymphatic ROS: No chills, No night sweats, No weight loss  Skin/Integumentary ROS: No edema, No evidence of rash, No itching      Exam:  /82   Pulse 94   Temp 36.3 °C (97.3 °F) (Temporal)   Resp 16   Ht 1.676 m (5' 6\")   Wt 72.1 kg (159 lb)   SpO2 97%   General: Well developed, well nourished. No distress.    HENT: Head is grossly normal.  Pulmonary: Unlabored respiratory effort.   Neurologic: Grossly nonfocal. No facial asymmetry noted.  Musculoskeletal: Diffuse edema is noted of the distal right forearm extending into the right hand.  Unable to make a fist or fully extend her fingers due to pain.  Diffuse tenderness is noted in the dorsum of the right hand extending into the mid right forearm.  Pain with supination.  Pustule is noted over the extensor surface of the right distal forearm, radial aspect with surrounding erythema and tenderness.  Skin: Warm, dry, good turgor. No rashes in visible areas.   Psych: Normal mood. Alert and oriented to person, place and time.    Right hand, per radiology:     1.  No acute fracture is identified.     2.  Osteoarthritis of the interphalangeal joints, most pronounced in the first ITP joint.     3.  Positive ulnar variance with findings suggestive of ulnar abutment.    Right forearm, per radiology:  1.  No acute fracture is identified.     2.  Positive ulnar variation with early evidence of ulnar abutment.    Assessment/Plan:  Negative fracture on x-ray.  Wrist guard provided for comfort.  Patient requesting pain medication.    Discussed appropriate over-the-counter symptomatic medication, and when to return to clinic. Follow up for worsening or persistent symptoms.    Prescription Monitoring Program report was reviewed. No " evidence of medication abuse or misuse.    1. Sprain of right wrist, initial encounter  DX-FOREARM RIGHT    HYDROcodone-acetaminophen (NORCO) 5-325 MG Tab per tablet   2. Contusion of right hand, initial encounter  DX-HAND 3+ RIGHT    HYDROcodone-acetaminophen (NORCO) 5-325 MG Tab per tablet   3. Skin infection  doxycycline (VIBRAMYCIN) 100 MG Tab   4. Fall from ground level

## 2021-05-17 ENCOUNTER — OFFICE VISIT (OUTPATIENT)
Dept: NEUROLOGY | Facility: MEDICAL CENTER | Age: 66
End: 2021-05-17
Attending: NURSE PRACTITIONER
Payer: MEDICARE

## 2021-05-17 VITALS
SYSTOLIC BLOOD PRESSURE: 110 MMHG | WEIGHT: 164.9 LBS | BODY MASS INDEX: 26.5 KG/M2 | HEIGHT: 66 IN | OXYGEN SATURATION: 93 % | HEART RATE: 110 BPM | DIASTOLIC BLOOD PRESSURE: 60 MMHG | RESPIRATION RATE: 16 BRPM

## 2021-05-17 DIAGNOSIS — G40.909 SEIZURE DISORDER (HCC): ICD-10-CM

## 2021-05-17 PROCEDURE — 99211 OFF/OP EST MAY X REQ PHY/QHP: CPT | Performed by: NURSE PRACTITIONER

## 2021-05-17 PROCEDURE — 99213 OFFICE O/P EST LOW 20 MIN: CPT | Performed by: NURSE PRACTITIONER

## 2021-05-17 RX ORDER — LEVETIRACETAM 500 MG/1
1000 TABLET ORAL 2 TIMES DAILY
Qty: 120 TABLET | Refills: 3 | Status: SHIPPED | OUTPATIENT
Start: 2021-05-17 | End: 2021-10-13

## 2021-05-17 ASSESSMENT — ENCOUNTER SYMPTOMS
DOUBLE VISION: 0
SEIZURES: 0
DEPRESSION: 1
NAUSEA: 0
COUGH: 0
MUSCULOSKELETAL NEGATIVE: 1
ABDOMINAL PAIN: 0
HEADACHES: 0
DIARRHEA: 0
NERVOUS/ANXIOUS: 0
SORE THROAT: 0
VOMITING: 0
CONSTITUTIONAL NEGATIVE: 1
MEMORY LOSS: 1

## 2021-05-17 ASSESSMENT — PATIENT HEALTH QUESTIONNAIRE - PHQ9
SUM OF ALL RESPONSES TO PHQ QUESTIONS 1-9: 13
CLINICAL INTERPRETATION OF PHQ2 SCORE: 6
5. POOR APPETITE OR OVEREATING: 0 - NOT AT ALL

## 2021-05-17 NOTE — PROGRESS NOTES
"Subjective:      Beatrice Caraballo is a 66 y.o. female who presents with Follow-Up (Seizure disorder)        Last seen 9/2019, only here today for her prescription to be renewed.    HPI   Reports that when her medication was changed a few years ago she had multiple seizures that \"destroyed her teeth\", broke through some windows, etc.  Historically, this event was discussed and it occurred approximately in 2017.  She has not had concern for seizures since that time.     She continues to take Keppra 1000mg BID consistently.  She has not had another seizure.  Reports that she is feeling well and thinks that this is the right dose for her.     Tried and failed dilantin in years past.     Has had a breakthrough seizure when skipping 5-6 days of Keppra last year.     She is by herself most of the time and has been cleaning out her mother's home who was a hoarder.  Complaining of significant left hip pain that is flared-up today and very tearful.  Reports that she has had a very long day already (at 1pm).        8/7/2019 EEG ICTAL AND/OR INTERICTAL FINDINGS:   A single burst of generalized, 4 to 5 Hz spike and slow-wave   complexes was captured during the study.  This was about 5   seconds in duration.  No clinical events or seizures were   reported or recorded during the study.      Historically has used marijuana but can't access it as she doesn't drive.    Advil prn-- to treat hip pain    Current Outpatient Medications   Medication Sig Dispense Refill   • levETIRAcetam (KEPPRA) 500 MG Tab TAKE TWO TABLETS BY MOUTH TWO TIMES A  tablet 0   • doxycycline (VIBRAMYCIN) 100 MG Tab Take 1 Tab by mouth 2 times a day. (Patient not taking: Reported on 5/17/2021) 14 Tab 0   • mag hydrox-al hydrox-simeth-diphenhydrAMINE-lidocaine viscous 2% Take 30 mL by mouth 4 times a day as needed. (Patient not taking: Reported on 5/17/2021) 250 mL 1     No current facility-administered medications for this visit.       Review of Systems " "  Constitutional: Negative.    HENT: Negative for hearing loss, nosebleeds and sore throat.         No recent head injury.   Eyes: Negative for double vision.        No new loss of vision.   Respiratory: Negative for cough.         No recent lung infections.   Cardiovascular: Negative for chest pain.   Gastrointestinal: Negative for abdominal pain, diarrhea, nausea and vomiting.   Genitourinary: Negative.    Musculoskeletal: Negative.    Skin: Negative.    Neurological: Negative for seizures and headaches.   Endo/Heme/Allergies:        No history of endocrine dysfunction.  No new problems.   Psychiatric/Behavioral: Positive for depression (very tearful today) and memory loss. The patient is not nervous/anxious.         No recent mood changes.          Objective:     /60 (BP Location: Right arm, Patient Position: Sitting, BP Cuff Size: Adult)   Pulse (!) 110   Resp 16   Ht 1.676 m (5' 6\")   Wt 74.8 kg (164 lb 14.5 oz)   SpO2 93%   BMI 26.62 kg/m²      Physical Exam  Constitutional:       General: She is not in acute distress.  HENT:      Head: Normocephalic and atraumatic.   Eyes:      Pupils: Pupils are equal, round, and reactive to light.   Cardiovascular:      Rate and Rhythm: Normal rate and regular rhythm.   Pulmonary:      Effort: Pulmonary effort is normal. No respiratory distress.      Breath sounds: Normal breath sounds.   Musculoskeletal:         General: Normal range of motion.      Cervical back: Normal range of motion.   Skin:     General: Skin is warm and dry.      Coloration: Skin is pale.   Neurological:      Mental Status: She is alert and oriented to person, place, and time.      Cranial Nerves: No cranial nerve deficit.      Motor: No abnormal muscle tone.      Coordination: Coordination normal.      Deep Tendon Reflexes: Reflexes are normal and symmetric.   Psychiatric:         Mood and Affect: Affect is labile and tearful.         Speech: Speech is tangential.         Cognition and " Memory: Cognition is impaired.      Comments: Restless                Neurological Exam  Mental Status  Alert.    Cranial Nerves  CN III, IV, VI: Pupils equal round and reactive to light bilaterally.    Reflexes  Deep tendon reflexes are 2+ and symmetric in all four extremities with downgoing toes bilaterally.           Assessment/Plan:        Seizure disorder, unclear type:  20 year history of sporadic events concerning for seizures and poor management and compliance of AED's.  Last known seizure was mid-March 2019.     Increased Keppra to 1000mg BID and reports no seizures since that time.     8/7/2019 EEG ICTAL AND/OR INTERICTAL FINDINGS:   A single burst of generalized, 4 to 5 Hz spike and slow-wave   complexes was captured during the study.  This was about 5   seconds in duration.  No clinical events or seizures were   reported or recorded during the study.      Continue leviteracetam 1000mg BID.     Needs to take MVI and Calcium 2000mg per day but she does not wish to.     Discussed general safety including cooking and water safety.   Request that a family member or friend attend the next appointment with her.     She does not have a rescue or emergent medication.      Return for follow-up in 6 months or sooner if needed.      EDUCATION AND COUNSELING:  -Education was provided to the patient and/or family regarding diagnosis and prognosis. The chronic and unpredictable nature of the condition was discussed. There is increased risk for additional events, which may carry potential for significant injuries and death.    -We reviewed the current antiepileptic regimen. Potential side effects of antiepileptics were discussed at length, including but no limited to: hypersensitivity reactions (rash and others, some of which can be fatal), visual field changes (some of which may be irreversible), glaucoma, diplopia, kidney stones, osteopenia/osteoporosis/bone fractures, hyperthermia/anhydrosis, tremors, ataxia,  dizziness, fatigue, increased risk for falls, cardiac arrhythmias/syncope, gastrointestinal (hepatitis, pancreatitis, gastritis, ulcers), gingival hypertrophy, drowsiness, sedation, anxiety/nervousness, increased risk for suicide, increased risk for depression, and psychosis. We reviewed drug-drug interactions and their potential effect on seizure control and medication side effects.    -Patient/family educated on SUDEP (Sudden Death in Epilepsy). Counseling was provided on the importance of strict medication and follow up compliance. The patient understands the risks associated with non-adherence with the medical plan as outlined, including but not limited to an increased risk for breakthrough seizures, which may contribute to injuries, disability, status epilepticus, and even death.    -Counseling was also provided on potential effects of alcohol and other drugs, which may lower seizure threshold and/or affect the metabolism of antiepileptic drugs. I recommend avoidance of alcohol and illegal drugs.  -Recommend proper hydration, regular exercise, proper sleep hygiene (7-8 hrs of overnight sleep, avoid sleep deprivation).    -I have made the patient aware of mandatory reporting required by the law in the [place] of [place] regarding episodes of seizures, loss of consciousness, and/or alteration of awareness. The patient and family are responsible for reporting events to the DMV, instructions were provided. The patient verbalized understanding and states she has not been driving. Other seizure precautions were discussed at length, including no diving, no skydiving, no unsupervised swimming, no Jacuzzi or bathing in bathtubs.       Pt agrees with plan.

## 2021-10-13 DIAGNOSIS — G40.909 SEIZURE DISORDER (HCC): ICD-10-CM

## 2021-10-13 RX ORDER — LEVETIRACETAM 500 MG/1
1000 TABLET ORAL 2 TIMES DAILY
Qty: 120 TABLET | Refills: 0 | Status: SHIPPED | OUTPATIENT
Start: 2021-10-13 | End: 2021-11-16

## 2021-10-13 NOTE — TELEPHONE ENCOUNTER
Received request via: Pharmacy    Was the patient seen in the last year in this department? Yes    Does the patient have an active prescription (recently filled or refills available) for medication(s) requested? Yes.   Was mira Schroeder scheduled with dr Lancaster

## 2021-10-17 ENCOUNTER — OFFICE VISIT (OUTPATIENT)
Dept: URGENT CARE | Facility: PHYSICIAN GROUP | Age: 66
End: 2021-10-17
Payer: MEDICARE

## 2021-10-17 VITALS
WEIGHT: 166 LBS | TEMPERATURE: 97.9 F | RESPIRATION RATE: 16 BRPM | DIASTOLIC BLOOD PRESSURE: 80 MMHG | HEIGHT: 65 IN | HEART RATE: 104 BPM | OXYGEN SATURATION: 93 % | BODY MASS INDEX: 27.66 KG/M2 | SYSTOLIC BLOOD PRESSURE: 122 MMHG

## 2021-10-17 DIAGNOSIS — K04.7 DENTAL INFECTION: ICD-10-CM

## 2021-10-17 PROCEDURE — 99213 OFFICE O/P EST LOW 20 MIN: CPT | Performed by: PHYSICIAN ASSISTANT

## 2021-10-17 RX ORDER — CLINDAMYCIN HYDROCHLORIDE 300 MG/1
300 CAPSULE ORAL 3 TIMES DAILY
Qty: 30 CAPSULE | Refills: 0 | Status: SHIPPED | OUTPATIENT
Start: 2021-10-17 | End: 2021-10-27

## 2021-10-17 RX ORDER — HYDROCODONE BITARTRATE AND ACETAMINOPHEN 5; 325 MG/1; MG/1
1 TABLET ORAL EVERY 6 HOURS PRN
Qty: 8 TABLET | Refills: 0 | Status: SHIPPED | OUTPATIENT
Start: 2021-10-17 | End: 2021-10-19

## 2021-10-17 NOTE — PROGRESS NOTES
"Chief Complaint   Patient presents with   • Abscess     poss tooth abscess (R) side of mouth-tooth is broken        HISTORY OF PRESENT ILLNESS: Patient is a 66 y.o. female who presents today for the following:    Dental pain started last night.  Severe pain, worsening.  Facial swelling on the right side.  Has not been able to sleep due to pain, despite over-the-counter medications.  Does not yet have an appointment with the dentist.    Patient Active Problem List    Diagnosis Date Noted   • Right-sided chest pain 2018   • Cough 2018   • Seizure disorder (HCC) 2016       Allergies:Patient has no known allergies.    Current Outpatient Medications Ordered in Epic   Medication Sig Dispense Refill   • clindamycin (CLEOCIN) 300 MG Cap Take 1 Capsule by mouth 3 times a day for 10 days. 30 Capsule 0   • HYDROcodone-acetaminophen (NORCO) 5-325 MG Tab per tablet Take 1 Tablet by mouth every 6 hours as needed for up to 2 days. 8 Tablet 0   • levETIRAcetam (KEPPRA) 500 MG Tab TAKE 2 TABLETS BY MOUTH 2 TIMES A DAY. 120 Tablet 0     No current Epic-ordered facility-administered medications on file.       Past Medical History:   Diagnosis Date   • Seizure (HCC)    • Seizure (HCC)        Social History     Tobacco Use   • Smoking status: Current Every Day Smoker     Packs/day: 0.25     Types: Cigarettes   • Smokeless tobacco: Never Used   • Tobacco comment: Patient states a pack lasts her 3 to 4 days   Substance Use Topics   • Alcohol use: Yes     Comment: \"1-2 drinks/month\"   • Drug use: No       Family Status   Relation Name Status   • Mo  Alive   • Fa     • Sis     • Bro       Family History   Problem Relation Age of Onset   • Cancer Sister        Review of Systems:   Pertinent systems reviewed with the patient.      Exam:  /80   Pulse (!) 104   Temp 36.6 °C (97.9 °F)   Resp 16   Ht 1.651 m (5' 5\")   Wt 75.3 kg (166 lb)   SpO2 93%   General: Well developed, well nourished. No " distress.    Mouth: Broken tooth noted on the bottom right, very last molar, with surrounding erythema, edema, and tenderness.  No drainage noted.  Pulmonary: Unlabored respiratory effort.   Neurologic: Grossly nonfocal. No facial asymmetry noted.  Skin: Warm, dry, good turgor. No rashes in visible areas.   Psych: Normal mood. Alert and oriented to person, place and time.    Assessment/Plan:  Starting antibiotics.  Patient has not been able to sleep due to pain.  Discussed appropriate over-the-counter symptomatic medication, and when to return to clinic. Follow up for worsening or persistent symptoms.  Follow-up with a dentist as soon as possible.    Prescription Monitoring Program report was reviewed. No evidence of medication abuse or misuse.    1. Dental infection  clindamycin (CLEOCIN) 300 MG Cap    HYDROcodone-acetaminophen (NORCO) 5-325 MG Tab per tablet

## 2021-10-29 ENCOUNTER — TELEPHONE (OUTPATIENT)
Dept: SCHEDULING | Facility: IMAGING CENTER | Age: 66
End: 2021-10-29

## 2021-11-16 DIAGNOSIS — G40.909 SEIZURE DISORDER (HCC): ICD-10-CM

## 2021-11-16 RX ORDER — LEVETIRACETAM 500 MG/1
1000 TABLET ORAL 2 TIMES DAILY
Qty: 120 TABLET | Refills: 0 | Status: SHIPPED | OUTPATIENT
Start: 2021-11-16 | End: 2021-11-18 | Stop reason: SDUPTHER

## 2021-11-16 NOTE — TELEPHONE ENCOUNTER
Received request via: Pharmacy    Was the patient seen in the last year in this department? No  upcoming appt     Does the patient have an active prescription (recently filled or refills available) for medication(s) requested? yes

## 2021-11-18 ENCOUNTER — OFFICE VISIT (OUTPATIENT)
Dept: NEUROLOGY | Facility: MEDICAL CENTER | Age: 66
End: 2021-11-18
Attending: PSYCHIATRY & NEUROLOGY
Payer: MEDICARE

## 2021-11-18 VITALS
OXYGEN SATURATION: 96 % | WEIGHT: 171.3 LBS | HEART RATE: 77 BPM | BODY MASS INDEX: 28.54 KG/M2 | SYSTOLIC BLOOD PRESSURE: 136 MMHG | RESPIRATION RATE: 14 BRPM | DIASTOLIC BLOOD PRESSURE: 70 MMHG | HEIGHT: 65 IN

## 2021-11-18 DIAGNOSIS — G40.909 SEIZURE DISORDER (HCC): ICD-10-CM

## 2021-11-18 PROCEDURE — 99215 OFFICE O/P EST HI 40 MIN: CPT | Performed by: PSYCHIATRY & NEUROLOGY

## 2021-11-18 RX ORDER — IBUPROFEN 200 MG
400 TABLET ORAL EVERY 6 HOURS PRN
COMMUNITY
End: 2022-09-21

## 2021-11-18 RX ORDER — LEVETIRACETAM 500 MG/1
1000 TABLET ORAL 2 TIMES DAILY
Qty: 360 TABLET | Refills: 3 | Status: SHIPPED | OUTPATIENT
Start: 2021-11-18 | End: 2022-10-13

## 2021-11-18 NOTE — PROGRESS NOTES
Follow-up visit    Patient: Beatrice Caraballo  : 1955    Referring Physician: No ref. provider found   Akash Vilchis M.D.    CC: seizures    IMPRESSION:  1. 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 Etiology/Syndrome- genetic   3. EEG oxaveezm-5-7 Hz genearlized dischares   4. Antiepileptic drug side effects and counseling done   LEV 1000/1000  5. Brain imaging n/a      PLAN:  1. Seizure disorder (HCC)  - levETIRAcetam (KEPPRA) 500 MG Tab; Take 2 Tablets by mouth 2 times a day.  Dispense: 360 Tablet; Refill: 3    Other orders  - ibuprofen (MOTRIN) 200 MG Tab; Take 400 mg by mouth every 6 hours as needed.  Management options were discussed with patient.    Will continue current regimen   2. advise to reduce triggering factors  3. Seizure precautions were discussed in detail with patient: she does not drive   4. Return in 6-9 months or sooner if needed.     HISTORY:    I had the opportunity to see Ms. Beatrice Caraballo in the epilepsy clinic on 2021 for follow up on seizure disorder. she came alone. she was last seen by Elda on 2021.     Dominant hand: right handed     In brief, her pertinent medical history is remarkable for genearlized epilepsy based on EEG finding     Seizure type 1 - convulsion     The onset of seizure was in her 30s, the ictal behavior was stereotyped and described as  No warning except feeling strange, convulsion, fall. Some injuries were reported.   At worst 5 seizures over a few months.   The last seizure was in mid-2019.  In 2017 when her medication was changed with resultant mulitiple seizures. She broke teeth and some windows.     The longest seizure free period was 2 year   The seizures were isolated but recurred occasionally up to X times in one day. There was    no cluster of seizures,    no history of status epilepticus    Special  features:  They were noted only during the wake, there was no specific timing.     Potential triggering factors were reviewed   Sleep deprivation   Alcohol   Stress   Periods   Other     Epilepsy risk factors:     -Positive: None  -Negative: Normal prenatal and  course. Normal development physically and intellectually. No febrile convulsions. No history of severe head trauma. No meningitis. No stroke. No alcohol abuse. No significant exposure to recreational drugs. There is no family history of epilepsy, or spells.  High school 10th grade. 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       Current AEDs:  LEV 1000/1000      Previous workup:    1. EEG data: 4-5 Hz generalized discharges up to 5 seconds (2019)    2. Neuroimaging data: n/a    3. Recent AED level:    Prior antiepileptic medications were reviewed  Pb  LEV  PHT  VPA    Antiepileptic medications most useful: LEV     Other therapeutic interventions:   Vagus nerve stimulation   Diet   Surgery for epilepsy   Other    I reviewed the previous medical history, surgical, family and social histories in the electronic medical record.   On review of systems, 10 of 14 systems are reviewed and found to be negative except as listed above.     Current Outpatient Medications   Medication Sig Dispense Refill   • ibuprofen (MOTRIN) 200 MG Tab Take 400 mg by mouth every 6 hours as needed.     • levETIRAcetam (KEPPRA) 500 MG Tab Take 2 Tablets by mouth 2 times a day. 360 Tablet 3     No current facility-administered medications for this visit.        No Known Allergies    Past Medical History:   Diagnosis Date   • Seizure (HCC)    • Seizure (HCC)        Social History     Socioeconomic History   • Marital status:      Spouse name: Not on file   • Number of children: Not on file   • Years of education: Not on file   • Highest education level: Not on file   Occupational History   • Not on file   Tobacco Use   • Smoking status: Current Every Day Smoker     Packs/day: 0.25  "    Types: Cigarettes   • Smokeless tobacco: Never Used   • Tobacco comment: Patient states a pack lasts her 3 to 4 days   Substance and Sexual Activity   • Alcohol use: Yes     Comment: \"1-2 drinks/month\"   • Drug use: No   • Sexual activity: Not on file   Other Topics Concern   • Not on file   Social History Narrative   • Not on file     Social Determinants of Health     Financial Resource Strain:    • Difficulty of Paying Living Expenses: Not on file   Food Insecurity:    • Worried About Running Out of Food in the Last Year: Not on file   • Ran Out of Food in the Last Year: Not on file   Transportation Needs:    • Lack of Transportation (Medical): Not on file   • Lack of Transportation (Non-Medical): Not on file   Physical Activity:    • Days of Exercise per Week: Not on file   • Minutes of Exercise per Session: Not on file   Stress:    • Feeling of Stress : Not on file   Social Connections:    • Frequency of Communication with Friends and Family: Not on file   • Frequency of Social Gatherings with Friends and Family: Not on file   • Attends Yarsanism Services: Not on file   • Active Member of Clubs or Organizations: Not on file   • Attends Club or Organization Meetings: Not on file   • Marital Status: Not on file   Intimate Partner Violence:    • Fear of Current or Ex-Partner: Not on file   • Emotionally Abused: Not on file   • Physically Abused: Not on file   • Sexually Abused: Not on file   Housing Stability:    • Unable to Pay for Housing in the Last Year: Not on file   • Number of Places Lived in the Last Year: Not on file   • Unstable Housing in the Last Year: Not on file       Family History   Problem Relation Age of Onset   • Cancer Sister          PHYSICAL EXAM:      /70 (BP Location: Left arm, Patient Position: Sitting, BP Cuff Size: Adult)   Pulse 77   Resp 14   Ht 1.651 m (5' 5\")   Wt 77.7 kg (171 lb 4.8 oz)   SpO2 96%   BMI 28.51 kg/m²     On examination,  She appears her stated age. She " has a normal, reactive affect.No apparent distress,  alert and appropriate. No labored breathing.   There is no peripheral edema. Skin: no rash or abnormalities    She gives a precise account of  her clinical symptoms. She has fluent conversational speech, without error. No dysarthria.  facial movements intact. No UE drift. I see no tremor.    Gait is normal        The total visit duration was of 45 minutes of which more than 50% was spent in coordination of care and counseling.         Ponce Lancaster MD  Diplomate, American Board of Psychiatry and Neurology   Diplomate, American Board of Psychiatry and Neurology in Epilepsy

## 2021-11-18 NOTE — PATIENT INSTRUCTIONS
Seizure precautions    Driving    Every State restricts driving in people with seizures. [place] requires that you be seizure free for 3 months from the date of your last seizure. The Department of Motor Vehicles (DMV), not the doctor, makes the decision on driving. Exceptions may be made for seizures that do not affect mental condition and ability to control a car, or that occur 100% during sleep.. We recommend:  § Do not drive if you are having seizures that would be dangerous on the road.  § Be honest with your doctor about your seizures, even if driving may be at risk  § Be honest with the DMV (use the driving form). It may protect you legally if problems later occur.    Seizure medicines and suicide    Seizure medicines have long been known to help some people with depression, but also to make others worse. The FDA recently took a look at their database of clinical studies of people taking epilepsy medicines. Their finding was 4 suicides in 27,863 patients taking epilepsy medicines, versus none in patients taking placebo (an inactive pill). They reported 105 people of the 27,863 who did not commit suicide, but had thoughts of suicide. Combined, the risk for suicidal thoughts and behavior was about 0.4% (1 in 250) for those taking epilepsy medications and 0.2% (1 in 500) for those given placebos.    This is new and important information because doctors and patients need to know the possible side effects of medicines. But it needs to be put in perspective and certainly is no reason for panic. The 4 suicides in 27,863 is a very small percentage, and it is impossible to be sure the epilepsy drugs were the cause. Depression occurs in about 10% or more of people with epilepsy, even independent of medications. We recommend the following.  § Do not stop your seizure medicine. It could be dangerous.  § If you have symptoms of depression, such as crying and low mood, please discuss them at your clinic visits, so a  decision can be made about whether antidepressant medication or referral to a psychotherapist would be useful.  § If you are thinking seriously about suicide, please call 911.  § For most people, this FDA warning is just something to know, but not a reason to change medicines.    Bone health    Several of the older antiseizure medications may cause thinning of bones with long-term use, leading to broken bones later in life. This is most problematic with phenytoin (Dilantin), but may occur with carbamazepine (Tegretol, Carbatrol), phenobarbital, primidone (Mysoline) and possibly valproate/valproic acid (Depakote, Depakene). This is a larger concern for women in mid-life or older, but it also can be an issue for men and younger women.   § This potential problem is not an emergency and occurs over months to years; do not stop your seizure medication without discussing your concerns with your doctor.   § Calcium, vitamin D3 supplementation and regular exercise may be helpful to maintain bone health.  § Periodic bone density screening may be helpful to show existence or progression of bone thinning.     Water Safety    You could drown during a seizure that occurs in water. Use the [employer] system for swimming. Let the cathy know that you have seizures. Take showers instead of baths.    Burn safety    If you have uncontrolled seizures, be very careful around heat or flames. Cook on the back burner - you are less likely to lean on the burner or turn over the soup during a seizure. Don’t smoke, which is good advice for other reasons as well. Set the maximum house hot water temperature to 110 degrees Fahrenheit. Put guards on open fireplaces, wood stoves or radiators.     Heights    Occasional use of ladders and going up and down stairs is a reasonable risk. If your seizures are not in control, then do not work on ladders or unprotected heights for more than brief minutes.    Equipment and Power Tools    Cutting, chopping and  drilling equipment should have safety guards to avoid inadvertent injury; otherwise, do not use it if your seizures are not fully controlled. Do not use mowers lacking automatic stop switches or chain saws.     Safety    If you have uncontrolled seizures, do not carry your child in your arms, but use one of the slings/papooses. Change the baby on the floor. Do not bathe the baby in water deep enough for the mouth to be underwater. Breastfeeding is usually considered beneficial, even though small amounts of seizure medicines come out in the breast milk.    Fall Precautions      If you fall with some of your seizures, then fall-proof your environment. Put in carpets, cover sharp corners, and consider wearing a protective helmet in some circumstances.    Sudden Unexplained Death in Epilepsy (SUDEP)    It is rare for people to die from a seizure, but it can happen. One way is trauma or a car crash from a seizure. Another is the poorly understood condition called sudden unexplained death in epilepsy (SUDEP). We think this is most likely due to heart arrhythmias (irregular beats) caused by a seizure, but the mechanism is debated. For people with uncontrolled seizures we recommend:  § Do not suddenly stop your seizure medication, since this can be a risk factor for SUDEP.  § Do not be overly worried about SUDEP. It is tragic when it happens, but it is uncommon and there are currently no preventive measures, other than the best possible seizure control.    Medication Side Effects    To be approved for prescription use, seizure medicines must pass strict safety testing. Nevertheless, they all have side effects, some of which are potentially serious or even lethal. The risks of medications must be balanced against the risks of seizures. A full discussion of possible medicine side effects is not possible here, but we recommend:  § Know the main side effects of your seizure medicines. Your doctor is the best source  for individual information. Web sites such as epilepsyfoundation.org and epilepsy.com have good information.  § The package insert provided with your prescription lists full information on side effects, but most of these will never occur in an individual. Let the package insert inform you, but not scare you. Be aware that some side effects occur from drug interactions among all your medications. Interactions can involve prescription medicines, over-the-counter medicines, herbal remedies and even some foods. Grapefruit juice is an example of a seemingly benign food that can raise levels of carbamazepine or other drugs.  § Generic medications are less expensive, but may not produce the same blood levels as do brand name drugs or even other generics. Insurance plans and pharmacies sometimes switch to generics without patient or doctor approval. Be cautious if you are switching or being switched to generics. It may work out fine (and it often is a lot less expensive), but a blood test to check levels might be useful.    Recreation    If having a seizure during a recreational activity would cause you significant harm, then do not do the activity. Use common sense. Confer with your medical team for individual restrictions. As a general guideline for starting discussion with your medical team, we recommend:  § Low-risk recreation can be done by people with seizures, even if not in control. These include walking, running, bowling, golf, baseball, basketball, soccer, volleyball, swimming with the [employer] system, weight training with machines or spotters, elliptical trainers, treadmills with spotters. Confirm this with your medical care team.  § You should be able to go at least 3 months without a seizure to participate in medium-risk activities, but confirm this interval with your doctor. These include football, hockey, ice skating, bike racing, gymnastics, horseback riding and boating.  § You should be seizure free for more  than a year to perform high-risk activities, although some doctors recommend not engaging in high-risk recreational activities at all with a history of epilepsy. Ask yours if it is safe to engage in high-risk recreation. High-risk activities include hang gliding, motor sports, skiing, competitive skateboarding, mountain or rock climbing and SCUBA diving.      Seizure precautions    Driving    Every State restricts driving in people with seizures. [place] requires that you be seizure free for 3 months from the date of your last seizure. The Department of Motor Vehicles (DMV), not the doctor, makes the decision on driving. Exceptions may be made for seizures that do not affect mental condition and ability to control a car, or that occur 100% during sleep.. We recommend:  § Do not drive if you are having seizures that would be dangerous on the road.  § Be honest with your doctor about your seizures, even if driving may be at risk  § Be honest with the DMV (use the driving form). It may protect you legally if problems later occur.    Seizure medicines and suicide    Seizure medicines have long been known to help some people with depression, but also to make others worse. The FDA recently took a look at their database of clinical studies of people taking epilepsy medicines. Their finding was 4 suicides in 27,863 patients taking epilepsy medicines, versus none in patients taking placebo (an inactive pill). They reported 105 people of the 27,863 who did not commit suicide, but had thoughts of suicide. Combined, the risk for suicidal thoughts and behavior was about 0.4% (1 in 250) for those taking epilepsy medications and 0.2% (1 in 500) for those given placebos.    This is new and important information because doctors and patients need to know the possible side effects of medicines. But it needs to be put in perspective and certainly is no reason for panic. The 4 suicides in 27,863 is a very small percentage, and it is  impossible to be sure the epilepsy drugs were the cause. Depression occurs in about 10% or more of people with epilepsy, even independent of medications. We recommend the following.  § Do not stop your seizure medicine. It could be dangerous.  § If you have symptoms of depression, such as crying and low mood, please discuss them at your clinic visits, so a decision can be made about whether antidepressant medication or referral to a psychotherapist would be useful.  § If you are thinking seriously about suicide, please call 911.  § For most people, this FDA warning is just something to know, but not a reason to change medicines.    Bone health    Several of the older antiseizure medications may cause thinning of bones with long-term use, leading to broken bones later in life. This is most problematic with phenytoin (Dilantin), but may occur with carbamazepine (Tegretol, Carbatrol), phenobarbital, primidone (Mysoline) and possibly valproate/valproic acid (Depakote, Depakene). This is a larger concern for women in mid-life or older, but it also can be an issue for men and younger women.   § This potential problem is not an emergency and occurs over months to years; do not stop your seizure medication without discussing your concerns with your doctor.   § Calcium, vitamin D3 supplementation and regular exercise may be helpful to maintain bone health.  § Periodic bone density screening may be helpful to show existence or progression of bone thinning.     Water Safety    You could drown during a seizure that occurs in water. Use the cathy system for swimming. Let the cathy know that you have seizures. Take showers instead of baths.    Burn safety    If you have uncontrolled seizures, be very careful around heat or flames. Cook on the back burner - you are less likely to lean on the burner or turn over the soup during a seizure. Don’t smoke, which is good advice for other reasons as well. Set the maximum house hot water  temperature to 110 degrees Fahrenheit. Put guards on open fireplaces, wood stoves or radiators.     Heights    Occasional use of ladders and going up and down stairs is a reasonable risk. If your seizures are not in control, then do not work on ladders or unprotected heights for more than brief minutes.    Equipment and Power Tools    Cutting, chopping and drilling equipment should have safety guards to avoid inadvertent injury; otherwise, do not use it if your seizures are not fully controlled. Do not use mowers lacking automatic stop switches or chain saws.     Safety    If you have uncontrolled seizures, do not carry your child in your arms, but use one of the slings/papooses. Change the baby on the floor. Do not bathe the baby in water deep enough for the mouth to be underwater. Breastfeeding is usually considered beneficial, even though small amounts of seizure medicines come out in the breast milk.    Fall Precautions      If you fall with some of your seizures, then fall-proof your environment. Put in carpets, cover sharp corners, and consider wearing a protective helmet in some circumstances.    Sudden Unexplained Death in Epilepsy (SUDEP)    It is rare for people to die from a seizure, but it can happen. One way is trauma or a car crash from a seizure. Another is the poorly understood condition called sudden unexplained death in epilepsy (SUDEP). We think this is most likely due to heart arrhythmias (irregular beats) caused by a seizure, but the mechanism is debated. For people with uncontrolled seizures we recommend:  § Do not suddenly stop your seizure medication, since this can be a risk factor for SUDEP.  § Do not be overly worried about SUDEP. It is tragic when it happens, but it is uncommon and there are currently no preventive measures, other than the best possible seizure control.    Medication Side Effects    To be approved for prescription use, seizure medicines must pass strict safety  testing. Nevertheless, they all have side effects, some of which are potentially serious or even lethal. The risks of medications must be balanced against the risks of seizures. A full discussion of possible medicine side effects is not possible here, but we recommend:  § Know the main side effects of your seizure medicines. Your doctor is the best source for individual information. Web sites such as epilepsyfoundation.org and epilepsy.com have good information.  § The package insert provided with your prescription lists full information on side effects, but most of these will never occur in an individual. Let the package insert inform you, but not scare you. Be aware that some side effects occur from drug interactions among all your medications. Interactions can involve prescription medicines, over-the-counter medicines, herbal remedies and even some foods. Grapefruit juice is an example of a seemingly benign food that can raise levels of carbamazepine or other drugs.  § Generic medications are less expensive, but may not produce the same blood levels as do brand name drugs or even other generics. Insurance plans and pharmacies sometimes switch to generics without patient or doctor approval. Be cautious if you are switching or being switched to generics. It may work out fine (and it often is a lot less expensive), but a blood test to check levels might be useful.    Recreation    If having a seizure during a recreational activity would cause you significant harm, then do not do the activity. Use common sense. Confer with your medical team for individual restrictions. As a general guideline for starting discussion with your medical team, we recommend:  § Low-risk recreation can be done by people with seizures, even if not in control. These include walking, running, bowling, golf, baseball, basketball, soccer, volleyball, swimming with the [employer] system, weight training with machines or spotters, elliptical trainers,  treadmills with spotters. Confirm this with your medical care team.  § You should be able to go at least 3 months without a seizure to participate in medium-risk activities, but confirm this interval with your doctor. These include football, hockey, ice skating, bike racing, gymnastics, horseback riding and boating.  § You should be seizure free for more than a year to perform high-risk activities, although some doctors recommend not engaging in high-risk recreational activities at all with a history of epilepsy. Ask yours if it is safe to engage in high-risk recreation. High-risk activities include hang gliding, motor sports, skiing, competitive skateboarding, mountain or rock climbing and SCUBA diving.

## 2022-04-30 ENCOUNTER — HOSPITAL ENCOUNTER (EMERGENCY)
Facility: MEDICAL CENTER | Age: 67
End: 2022-04-30
Attending: EMERGENCY MEDICINE
Payer: MEDICARE

## 2022-04-30 VITALS
TEMPERATURE: 97 F | RESPIRATION RATE: 20 BRPM | HEIGHT: 65 IN | SYSTOLIC BLOOD PRESSURE: 148 MMHG | BODY MASS INDEX: 28.28 KG/M2 | HEART RATE: 63 BPM | OXYGEN SATURATION: 96 % | DIASTOLIC BLOOD PRESSURE: 73 MMHG | WEIGHT: 169.75 LBS

## 2022-04-30 DIAGNOSIS — R63.5 WEIGHT GAIN: ICD-10-CM

## 2022-04-30 LAB
ALBUMIN SERPL BCP-MCNC: 4.5 G/DL (ref 3.2–4.9)
ALBUMIN/GLOB SERPL: 1.6 G/DL
ALP SERPL-CCNC: 85 U/L (ref 30–99)
ALT SERPL-CCNC: 22 U/L (ref 2–50)
ANION GAP SERPL CALC-SCNC: 11 MMOL/L (ref 7–16)
APPEARANCE UR: ABNORMAL
AST SERPL-CCNC: 19 U/L (ref 12–45)
BACTERIA #/AREA URNS HPF: NEGATIVE /HPF
BASOPHILS # BLD AUTO: 0.5 % (ref 0–1.8)
BASOPHILS # BLD: 0.04 K/UL (ref 0–0.12)
BILIRUB SERPL-MCNC: 0.4 MG/DL (ref 0.1–1.5)
BILIRUB UR QL STRIP.AUTO: NEGATIVE
BUN SERPL-MCNC: 18 MG/DL (ref 8–22)
CALCIUM SERPL-MCNC: 9.5 MG/DL (ref 8.5–10.5)
CHLORIDE SERPL-SCNC: 103 MMOL/L (ref 96–112)
CO2 SERPL-SCNC: 24 MMOL/L (ref 20–33)
COLOR UR: YELLOW
CREAT SERPL-MCNC: 0.65 MG/DL (ref 0.5–1.4)
EOSINOPHIL # BLD AUTO: 0.08 K/UL (ref 0–0.51)
EOSINOPHIL NFR BLD: 0.9 % (ref 0–6.9)
EPI CELLS #/AREA URNS HPF: ABNORMAL /HPF
ERYTHROCYTE [DISTWIDTH] IN BLOOD BY AUTOMATED COUNT: 47.3 FL (ref 35.9–50)
GFR SERPLBLD CREATININE-BSD FMLA CKD-EPI: 95 ML/MIN/1.73 M 2
GLOBULIN SER CALC-MCNC: 2.9 G/DL (ref 1.9–3.5)
GLUCOSE SERPL-MCNC: 90 MG/DL (ref 65–99)
GLUCOSE UR STRIP.AUTO-MCNC: NEGATIVE MG/DL
HCT VFR BLD AUTO: 50 % (ref 37–47)
HGB BLD-MCNC: 17.1 G/DL (ref 12–16)
HYALINE CASTS #/AREA URNS LPF: ABNORMAL /LPF
IMM GRANULOCYTES # BLD AUTO: 0.03 K/UL (ref 0–0.11)
IMM GRANULOCYTES NFR BLD AUTO: 0.4 % (ref 0–0.9)
KETONES UR STRIP.AUTO-MCNC: 15 MG/DL
LEUKOCYTE ESTERASE UR QL STRIP.AUTO: NEGATIVE
LYMPHOCYTES # BLD AUTO: 1.37 K/UL (ref 1–4.8)
LYMPHOCYTES NFR BLD: 16.3 % (ref 22–41)
MCH RBC QN AUTO: 33.9 PG (ref 27–33)
MCHC RBC AUTO-ENTMCNC: 34.2 G/DL (ref 33.6–35)
MCV RBC AUTO: 99.2 FL (ref 81.4–97.8)
MICRO URNS: ABNORMAL
MONOCYTES # BLD AUTO: 0.68 K/UL (ref 0–0.85)
MONOCYTES NFR BLD AUTO: 8.1 % (ref 0–13.4)
NEUTROPHILS # BLD AUTO: 6.23 K/UL (ref 2–7.15)
NEUTROPHILS NFR BLD: 73.8 % (ref 44–72)
NITRITE UR QL STRIP.AUTO: NEGATIVE
NRBC # BLD AUTO: 0 K/UL
NRBC BLD-RTO: 0 /100 WBC
PH UR STRIP.AUTO: 5 [PH] (ref 5–8)
PLATELET # BLD AUTO: 278 K/UL (ref 164–446)
PMV BLD AUTO: 9.7 FL (ref 9–12.9)
POTASSIUM SERPL-SCNC: 3.7 MMOL/L (ref 3.6–5.5)
PROT SERPL-MCNC: 7.4 G/DL (ref 6–8.2)
PROT UR QL STRIP: NEGATIVE MG/DL
RBC # BLD AUTO: 5.04 M/UL (ref 4.2–5.4)
RBC # URNS HPF: ABNORMAL /HPF
RBC UR QL AUTO: NEGATIVE
SODIUM SERPL-SCNC: 138 MMOL/L (ref 135–145)
SP GR UR STRIP.AUTO: 1.03
UROBILINOGEN UR STRIP.AUTO-MCNC: 0.2 MG/DL
WBC # BLD AUTO: 8.4 K/UL (ref 4.8–10.8)
WBC #/AREA URNS HPF: ABNORMAL /HPF

## 2022-04-30 PROCEDURE — 36415 COLL VENOUS BLD VENIPUNCTURE: CPT

## 2022-04-30 PROCEDURE — 80053 COMPREHEN METABOLIC PANEL: CPT

## 2022-04-30 PROCEDURE — 99284 EMERGENCY DEPT VISIT MOD MDM: CPT

## 2022-04-30 PROCEDURE — 85025 COMPLETE CBC W/AUTO DIFF WBC: CPT

## 2022-04-30 PROCEDURE — 81001 URINALYSIS AUTO W/SCOPE: CPT

## 2022-04-30 NOTE — ED PROVIDER NOTES
"ED Provider Note    CHIEF COMPLAINT  Chief Complaint   Patient presents with   • Weight Gain     Pt reports having increase weight gain in abdomen.  Pt reports having similar episode 20 years ago and given water pill to remove fluid. Pt reports symptoms similar and returning over past month.  n       HPI  Beatrice Caraballo is a 67 y.o. female who presents with gradually increasing abdominal girth.  Patient states that she feels like her abdomen is swollen.  This started about 2 months ago, but the patient reports she has gained 20 pounds since she moved to [place] 8 years ago.  This seems worse than prior.  Over the last day she has had some abdominal discomfort mostly located in the upper abdomen.  Characterized as a burning discomfort.  She has not had a fever or chills.  She has had urinary urgency and frequency.  She does not feel like she urinates all the fluid out.  She reports she had a similar problem many years ago when she was living in [place].  She was given some sort of water pill that improved to the abdominal swelling.  She denies swelling of her extremities.  No chest pain shortness of breath cough.    Patient moved to [place] 8 years ago.  She has not establish primary care.  She has been followed by her neurologist.    REVIEW OF SYSTEMS  As per HPI, otherwise a 10 point review of systems is negative    PAST MEDICAL HISTORY  Past Medical History:   Diagnosis Date   • Seizure (HCC)    • Seizure (HCC)        SOCIAL HISTORY  Social History     Tobacco Use   • Smoking status: Current Every Day Smoker     Packs/day: 0.50     Types: Cigarettes   • Smokeless tobacco: Never Used   • Tobacco comment: Patient states a pack lasts her 3 to 4 days   Vaping Use   • Vaping Use: Never used   Substance Use Topics   • Alcohol use: Yes     Comment: \"1-2 drinks/month\"   • Drug use: Yes     Types: Intravenous     Comment: MJ       SURGICAL HISTORY  Past Surgical History:   Procedure Laterality Date   • ARTHROPLASTY   " "      CURRENT MEDICATIONS  Home Medications     Reviewed by Robyn Gomez R.N. (Registered Nurse) on 04/30/22 at 1003  Med List Status: Partial   Medication Last Dose Status   ibuprofen (MOTRIN) 200 MG Tab  Active   levETIRAcetam (KEPPRA) 500 MG Tab  Active                ALLERGIES  No Known Allergies    PHYSICAL EXAM  VITAL SIGNS: /81   Pulse 92   Temp 36.1 °C (97 °F) (Temporal)   Resp 19   Ht 1.651 m (5' 5\")   Wt 77 kg (169 lb 12.1 oz)   SpO2 97%   BMI 28.25 kg/m²    Constitutional: Awake and alert  HENT: Normal inspection  Eyes: Normal inspection  Neck: Grossly normal range of motion.  Cardiovascular: Normal heart rate, Normal rhythm.  Symmetric peripheral pulses.   Thorax & Lungs: No respiratory distress, No wheezing, No rales, No rhonchi, No chest tenderness.   Abdomen: Bowel sounds normal, soft, non-distended, nontender, no mass.  No fluid wave.  Bedside ultrasound does not show any ascites.  Skin: No obvious rash.  Back: No tenderness, No CVA tenderness.   Extremities: No clubbing, cyanosis, edema, no Homans or cords.  Neurologic: Grossly normal   Psychiatric: Normal for situation        Labs:  Results for orders placed or performed during the hospital encounter of 04/30/22   CBC WITH DIFFERENTIAL   Result Value Ref Range    WBC 8.4 4.8 - 10.8 K/uL    RBC 5.04 4.20 - 5.40 M/uL    Hemoglobin 17.1 (H) 12.0 - 16.0 g/dL    Hematocrit 50.0 (H) 37.0 - 47.0 %    MCV 99.2 (H) 81.4 - 97.8 fL    MCH 33.9 (H) 27.0 - 33.0 pg    MCHC 34.2 33.6 - 35.0 g/dL    RDW 47.3 35.9 - 50.0 fL    Platelet Count 278 164 - 446 K/uL    MPV 9.7 9.0 - 12.9 fL    Neutrophils-Polys 73.80 (H) 44.00 - 72.00 %    Lymphocytes 16.30 (L) 22.00 - 41.00 %    Monocytes 8.10 0.00 - 13.40 %    Eosinophils 0.90 0.00 - 6.90 %    Basophils 0.50 0.00 - 1.80 %    Immature Granulocytes 0.40 0.00 - 0.90 %    Nucleated RBC 0.00 /100 WBC    Neutrophils (Absolute) 6.23 2.00 - 7.15 K/uL    Lymphs (Absolute) 1.37 1.00 - 4.80 K/uL    Monos (Absolute) " 0.68 0.00 - 0.85 K/uL    Eos (Absolute) 0.08 0.00 - 0.51 K/uL    Baso (Absolute) 0.04 0.00 - 0.12 K/uL    Immature Granulocytes (abs) 0.03 0.00 - 0.11 K/uL    NRBC (Absolute) 0.00 K/uL   COMP METABOLIC PANEL   Result Value Ref Range    Sodium 138 135 - 145 mmol/L    Potassium 3.7 3.6 - 5.5 mmol/L    Chloride 103 96 - 112 mmol/L    Co2 24 20 - 33 mmol/L    Anion Gap 11.0 7.0 - 16.0    Glucose 90 65 - 99 mg/dL    Bun 18 8 - 22 mg/dL    Creatinine 0.65 0.50 - 1.40 mg/dL    Calcium 9.5 8.5 - 10.5 mg/dL    AST(SGOT) 19 12 - 45 U/L    ALT(SGPT) 22 2 - 50 U/L    Alkaline Phosphatase 85 30 - 99 U/L    Total Bilirubin 0.4 0.1 - 1.5 mg/dL    Albumin 4.5 3.2 - 4.9 g/dL    Total Protein 7.4 6.0 - 8.2 g/dL    Globulin 2.9 1.9 - 3.5 g/dL    A-G Ratio 1.6 g/dL   URINALYSIS CULTURE, IF INDICATED    Specimen: Urine   Result Value Ref Range    Color Yellow     Character Cloudy (A)     Specific Gravity 1.026 <1.035    Ph 5.0 5.0 - 8.0    Glucose Negative Negative mg/dL    Ketones 15 (A) Negative mg/dL    Protein Negative Negative mg/dL    Bilirubin Negative Negative    Urobilinogen, Urine 0.2 Negative    Nitrite Negative Negative    Leukocyte Esterase Negative Negative    Occult Blood Negative Negative    Micro Urine Req Microscopic    ESTIMATED GFR   Result Value Ref Range    GFR (CKD-EPI) 95 >60 mL/min/1.73 m 2   URINE MICROSCOPIC (W/UA)   Result Value Ref Range    WBC 0-2 /hpf    RBC 0-2 /hpf    Bacteria Negative None /hpf    Epithelial Cells Few /hpf    Hyaline Cast 3-5 (A) /lpf         COURSE & MEDICAL DECISION MAKING  Patient presents complaining of abdominal swelling.  She has a benign exam.  She does not have ascites or any edema.  I obtained screening data which was all reassuring.  No renal failure or liver dysfunction or other remarkable abnormality.  At this point appropriate to follow-up with primary provider for further assessment.  I precautioned to return for pain, fevers or concern.      FINAL IMPRESSION  1.   Abdominal bloating  2.  Weight gain      This dictation was created using voice recognition software. The accuracy of the dictation is limited to the abilities of the software.  The nursing notes were reviewed and certain aspects of this information were incorporated into this note.      Electronically signed by: Fermin Marin M.D., 4/30/2022 10:30 AM

## 2022-04-30 NOTE — ED NOTES
Pt provided with discharge instructions. Pt verbalized understanding. PIV removed and pt assisted out of ED with steady gait.

## 2022-04-30 NOTE — ED TRIAGE NOTES
"Chief Complaint   Patient presents with   • Weight Gain     Pt reports having increase weight gain in abdomen.  Pt reports having similar episode 20 years ago and given water pill to remove fluid. Pt reports symptoms similar and returning over past month.  n     [facility].     /81   Pulse 92   Temp 36.1 °C (97 °F) (Temporal)   Resp 19   Ht 1.651 m (5' 5\")   Wt 77 kg (169 lb 12.1 oz)   SpO2 97%   BMI 28.25 kg/m²     "

## 2022-05-31 ENCOUNTER — OFFICE VISIT (OUTPATIENT)
Dept: URGENT CARE | Facility: PHYSICIAN GROUP | Age: 67
End: 2022-05-31
Payer: MEDICARE

## 2022-05-31 VITALS
BODY MASS INDEX: 27.99 KG/M2 | DIASTOLIC BLOOD PRESSURE: 76 MMHG | RESPIRATION RATE: 16 BRPM | SYSTOLIC BLOOD PRESSURE: 122 MMHG | TEMPERATURE: 97.5 F | HEART RATE: 93 BPM | WEIGHT: 168 LBS | HEIGHT: 65 IN | OXYGEN SATURATION: 94 %

## 2022-05-31 DIAGNOSIS — Z20.822 SUSPECTED COVID-19 VIRUS INFECTION: ICD-10-CM

## 2022-05-31 DIAGNOSIS — F17.200 CURRENT EVERY DAY SMOKER: ICD-10-CM

## 2022-05-31 DIAGNOSIS — R05.9 COUGH IN ADULT: ICD-10-CM

## 2022-05-31 DIAGNOSIS — R68.89 FLU-LIKE SYMPTOMS: ICD-10-CM

## 2022-05-31 DIAGNOSIS — J40 BRONCHITIS: ICD-10-CM

## 2022-05-31 LAB
EXTERNAL QUALITY CONTROL: NORMAL
FLUAV+FLUBV AG SPEC QL IA: NEGATIVE
INT CON NEG: NORMAL
INT CON POS: NORMAL
SARS-COV+SARS-COV-2 AG RESP QL IA.RAPID: NEGATIVE

## 2022-05-31 PROCEDURE — 94640 AIRWAY INHALATION TREATMENT: CPT | Mod: 76 | Performed by: NURSE PRACTITIONER

## 2022-05-31 PROCEDURE — 87426 SARSCOV CORONAVIRUS AG IA: CPT | Performed by: NURSE PRACTITIONER

## 2022-05-31 PROCEDURE — 99214 OFFICE O/P EST MOD 30 MIN: CPT | Mod: 25,CS | Performed by: NURSE PRACTITIONER

## 2022-05-31 PROCEDURE — 87804 INFLUENZA ASSAY W/OPTIC: CPT | Performed by: NURSE PRACTITIONER

## 2022-05-31 RX ORDER — IPRATROPIUM BROMIDE AND ALBUTEROL SULFATE 2.5; .5 MG/3ML; MG/3ML
3 SOLUTION RESPIRATORY (INHALATION) ONCE
Status: COMPLETED | OUTPATIENT
Start: 2022-05-31 | End: 2022-05-31

## 2022-05-31 RX ORDER — ALBUTEROL SULFATE 90 UG/1
2 AEROSOL, METERED RESPIRATORY (INHALATION) EVERY 4 HOURS PRN
Qty: 1 EACH | Refills: 0 | Status: SHIPPED | OUTPATIENT
Start: 2022-05-31 | End: 2022-06-14

## 2022-05-31 RX ORDER — DEXTROMETHORPHAN HYDROBROMIDE AND PROMETHAZINE HYDROCHLORIDE 15; 6.25 MG/5ML; MG/5ML
5 SYRUP ORAL EVERY 4 HOURS PRN
Qty: 100 ML | Refills: 0 | Status: SHIPPED | OUTPATIENT
Start: 2022-05-31 | End: 2022-06-07

## 2022-05-31 RX ORDER — DOXYCYCLINE HYCLATE 100 MG
100 TABLET ORAL 2 TIMES DAILY
Qty: 14 TABLET | Refills: 0 | Status: SHIPPED | OUTPATIENT
Start: 2022-05-31 | End: 2022-06-07

## 2022-05-31 RX ORDER — PREDNISONE 20 MG/1
40 TABLET ORAL DAILY
Qty: 10 TABLET | Refills: 0 | Status: SHIPPED | OUTPATIENT
Start: 2022-05-31 | End: 2022-06-05

## 2022-05-31 RX ORDER — BENZONATATE 100 MG/1
100 CAPSULE ORAL 3 TIMES DAILY PRN
Qty: 60 CAPSULE | Refills: 0 | Status: SHIPPED | OUTPATIENT
Start: 2022-05-31 | End: 2022-09-21

## 2022-05-31 RX ADMIN — IPRATROPIUM BROMIDE AND ALBUTEROL SULFATE 3 ML: 2.5; .5 SOLUTION RESPIRATORY (INHALATION) at 11:35

## 2022-05-31 RX ADMIN — IPRATROPIUM BROMIDE AND ALBUTEROL SULFATE 3 ML: 2.5; .5 SOLUTION RESPIRATORY (INHALATION) at 11:08

## 2022-05-31 ASSESSMENT — ENCOUNTER SYMPTOMS
WHEEZING: 1
DIARRHEA: 0
SORE THROAT: 0
CHILLS: 0
HEADACHES: 1
VOMITING: 0
NAUSEA: 0
SHORTNESS OF BREATH: 0
SINUS PAIN: 1
FEVER: 0
RHINORRHEA: 1
COUGH: 1
ABDOMINAL PAIN: 0
MYALGIAS: 0
HEMOPTYSIS: 0
SPUTUM PRODUCTION: 1

## 2022-05-31 ASSESSMENT — FIBROSIS 4 INDEX: FIB4 SCORE: 1.01

## 2022-05-31 ASSESSMENT — COPD QUESTIONNAIRES: COPD: 0

## 2022-05-31 NOTE — PROGRESS NOTES
Subjective:     Beatrice Caraballo is a 67 y.o. female who presents for Cough (X2-3days )      Cough  This is a new problem. The current episode started in the past 7 days (Beatrice is a pleasant 67 year old female who presents to  today with complaints of a productive cough, sinus congestion/pain, and sneezing). The problem has been unchanged. The cough is productive of purulent sputum. Associated symptoms include headaches, nasal congestion, rhinorrhea and wheezing. Pertinent negatives include no chills, ear pain, fever, hemoptysis, myalgias, sore throat or shortness of breath. Risk factors: Current every day smoker. 1 pack every 3-4 days. She has tried rest (Allergy relief, Asprin) for the symptoms. Her past medical history is significant for pneumonia. There is no history of asthma, COPD or environmental allergies.         Review of Systems   Constitutional: Positive for malaise/fatigue. Negative for chills and fever.   HENT: Positive for congestion, rhinorrhea and sinus pain. Negative for ear pain and sore throat.    Respiratory: Positive for cough, sputum production and wheezing. Negative for hemoptysis and shortness of breath.    Gastrointestinal: Negative for abdominal pain, diarrhea, nausea and vomiting.   Musculoskeletal: Negative for myalgias.   Neurological: Positive for headaches.   Endo/Heme/Allergies: Negative for environmental allergies.       PMH:   Past Medical History:   Diagnosis Date   • Seizure (HCC)    • Seizure (HCC)      ALLERGIES: No Known Allergies  SURGHX:   Past Surgical History:   Procedure Laterality Date   • ARTHROPLASTY       SOCHX:   Social History     Socioeconomic History   • Marital status:    Tobacco Use   • Smoking status: Current Every Day Smoker     Packs/day: 0.50     Types: Cigarettes   • Smokeless tobacco: Never Used   • Tobacco comment: Patient states a pack lasts her 3 to 4 days   Vaping Use   • Vaping Use: Never used   Substance and Sexual Activity   • Alcohol use: Yes  "    Comment: \"1-2 drinks/month\"   • Drug use: Yes     Types: Intravenous     Comment: SAMI     FH:   Family History   Problem Relation Age of Onset   • Cancer Sister          Objective:   /76   Pulse 93   Temp 36.4 °C (97.5 °F) (Temporal)   Resp 16   Ht 1.651 m (5' 5\")   Wt 76.2 kg (168 lb)   SpO2 94%   BMI 27.96 kg/m²     Physical Exam  Vitals and nursing note reviewed.   Constitutional:       General: She is not in acute distress.     Appearance: Normal appearance. She is ill-appearing.   HENT:      Head: Normocephalic and atraumatic.      Right Ear: Tympanic membrane, ear canal and external ear normal. There is no impacted cerumen.      Left Ear: Tympanic membrane, ear canal and external ear normal. There is no impacted cerumen.      Nose: Congestion present. No rhinorrhea.      Mouth/Throat:      Mouth: Mucous membranes are moist.      Pharynx: No oropharyngeal exudate or posterior oropharyngeal erythema.   Eyes:      General:         Right eye: No discharge.         Left eye: No discharge.      Extraocular Movements: Extraocular movements intact.      Pupils: Pupils are equal, round, and reactive to light.   Cardiovascular:      Rate and Rhythm: Normal rate and regular rhythm.      Pulses: Normal pulses.      Heart sounds: Normal heart sounds.   Pulmonary:      Effort: Pulmonary effort is normal.      Breath sounds: Examination of the right-upper field reveals decreased breath sounds. Examination of the left-upper field reveals decreased breath sounds. Examination of the right-middle field reveals decreased breath sounds. Examination of the left-middle field reveals decreased breath sounds. Examination of the right-lower field reveals decreased breath sounds. Examination of the left-lower field reveals decreased breath sounds. Decreased breath sounds present.      Comments: DuoNeb treatment given in the clinic today.  Lung sounds improved following treatment.  She notes symptoms of cough improving " following treatment.  O2 sat increased to 97% on room air.  Abdominal:      General: Abdomen is flat. Bowel sounds are normal.      Palpations: Abdomen is soft.      Tenderness: There is no abdominal tenderness. There is no right CVA tenderness or left CVA tenderness.   Musculoskeletal:         General: Normal range of motion.      Cervical back: Normal range of motion and neck supple. No tenderness.   Lymphadenopathy:      Cervical: No cervical adenopathy.   Skin:     General: Skin is warm and dry.      Capillary Refill: Capillary refill takes less than 2 seconds.   Neurological:      General: No focal deficit present.      Mental Status: She is alert and oriented to person, place, and time. Mental status is at baseline.   Psychiatric:         Mood and Affect: Mood normal.         Behavior: Behavior normal.         Thought Content: Thought content normal.         Judgment: Judgment normal.       POCT flu: Negative  POCT COVID: Negative  Assessment/Plan:   Assessment    1. Cough in adult  ipratropium-albuterol (DUONEB) nebulizer solution    ipratropium-albuterol (DUONEB) nebulizer solution    POCT SARS-COV Antigen SAVANNAH (Symptomatic only)    POCT Influenza A/B    promethazine-dextromethorphan (PROMETHAZINE-DM) 6.25-15 MG/5ML syrup    benzonatate (TESSALON) 100 MG Cap   2. Suspected COVID-19 virus infection  POCT Influenza A/B   3. Flu-like symptoms  POCT SARS-COV Antigen SAVANNAH (Symptomatic only)   4. Bronchitis  albuterol 108 (90 Base) MCG/ACT Aero Soln inhalation aerosol    promethazine-dextromethorphan (PROMETHAZINE-DM) 6.25-15 MG/5ML syrup    benzonatate (TESSALON) 100 MG Cap    doxycycline (VIBRAMYCIN) 100 MG Tab    predniSONE (DELTASONE) 20 MG Tab   5. Current every day smoker  albuterol 108 (90 Base) MCG/ACT Aero Soln inhalation aerosol     We discussed supportive measures including humidifier, warm salt water gargles, over-the-counter Cepacol throat lozenges, rest  and increased fluids. Pt was encouraged to seek  treatment back in the ER or urgent care for worsening symptoms,  fever greater than 100.5, wheezes or shortness of breath.    AVS handout given and reviewed with patient. Pt educated on red flags and when to seek treatment back in ER or UC.

## 2022-09-21 ENCOUNTER — OFFICE VISIT (OUTPATIENT)
Dept: URGENT CARE | Facility: PHYSICIAN GROUP | Age: 67
End: 2022-09-21
Payer: MEDICARE

## 2022-09-21 VITALS
BODY MASS INDEX: 28.51 KG/M2 | HEART RATE: 89 BPM | TEMPERATURE: 97.6 F | RESPIRATION RATE: 16 BRPM | HEIGHT: 64 IN | OXYGEN SATURATION: 90 % | WEIGHT: 167 LBS | DIASTOLIC BLOOD PRESSURE: 78 MMHG | SYSTOLIC BLOOD PRESSURE: 118 MMHG

## 2022-09-21 DIAGNOSIS — K04.7 DENTAL INFECTION: ICD-10-CM

## 2022-09-21 PROCEDURE — 99213 OFFICE O/P EST LOW 20 MIN: CPT | Performed by: FAMILY MEDICINE

## 2022-09-21 RX ORDER — CLINDAMYCIN HYDROCHLORIDE 300 MG/1
300 CAPSULE ORAL 3 TIMES DAILY
Qty: 30 CAPSULE | Refills: 0 | Status: SHIPPED | OUTPATIENT
Start: 2022-09-21 | End: 2022-10-01

## 2022-09-21 RX ORDER — HYDROCODONE BITARTRATE AND ACETAMINOPHEN 5; 325 MG/1; MG/1
TABLET ORAL
Qty: 8 TABLET | Refills: 0 | Status: SHIPPED | OUTPATIENT
Start: 2022-09-21 | End: 2022-09-23

## 2022-09-21 ASSESSMENT — FIBROSIS 4 INDEX: FIB4 SCORE: 1.01

## 2022-09-21 NOTE — PROGRESS NOTES
"Chief Complaint:    Chief Complaint   Patient presents with    Dental Pain     X2days        History of Present Illness:    Recently broke an upper tooth and has become painful. Clindamycin 300 mg TID and Hydrocodone 5-325 mg worked and were tolerated for other dental infection on 10/17/21, seen by other provider, visit reviewed.      Past Medical History:    Past Medical History:   Diagnosis Date    Seizure (HCC)     Seizure (HCC)      Past Surgical History:    Past Surgical History:   Procedure Laterality Date    ARTHROPLASTY       Social History:    Social History     Socioeconomic History    Marital status:      Spouse name: Not on file    Number of children: Not on file    Years of education: Not on file    Highest education level: Not on file   Occupational History    Not on file   Tobacco Use    Smoking status: Every Day     Packs/day: 0.50     Types: Cigarettes    Smokeless tobacco: Never    Tobacco comments:     Patient states a pack lasts her 3 to 4 days   Vaping Use    Vaping Use: Never used   Substance and Sexual Activity    Alcohol use: Yes     Comment: \"1-2 drinks/month\"    Drug use: Yes     Types: Intravenous     Comment: MJ    Sexual activity: Not on file   Other Topics Concern    Not on file   Social History Narrative    Not on file     Social Determinants of Health     Financial Resource Strain: Not on file   Food Insecurity: Not on file   Transportation Needs: Not on file   Physical Activity: Not on file   Stress: Not on file   Social Connections: Not on file   Intimate Partner Violence: Not on file   Housing Stability: Not on file     Family History:    Family History   Problem Relation Age of Onset    Cancer Sister      Medications:    Current Outpatient Medications on File Prior to Visit   Medication Sig Dispense Refill    levETIRAcetam (KEPPRA) 500 MG Tab Take 2 Tablets by mouth 2 times a day. 360 Tablet 3     No current facility-administered medications on file prior to visit. " "    Allergies:    No Known Allergies      Vitals:    Vitals:    22 1335   BP: 118/78   Pulse: 89   Resp: 16   Temp: 36.4 °C (97.6 °F)   TempSrc: Temporal   SpO2: 90%   Weight: 75.8 kg (167 lb)   Height: 1.626 m (5' 4\")       Physical Exam:    Constitutional: Vital signs reviewed. Appears well-developed and well-nourished. No acute distress.   Eyes: Sclera white, conjunctivae clear.   ENT: Upper tooth, just left of midline is broken down to be flush with the gum. External ears normal. Hearing normal. Lips are normal. Oral mucosa pink and moist.   Neck: Neck supple.   Pulmonary/Chest: Respirations non-labored.   Musculoskeletal: Normal gait. No muscular atrophy or weakness.  Neurological: Alert and oriented to person, place, and time. Muscle tone normal. Coordination normal.   Skin: No rashes or lesions. Warm, dry, normal turgor.  Psychiatric: Normal mood and affect. Behavior is normal. Judgment and thought content normal.       Medical Decision Makin. Dental infection  - clindamycin (CLEOCIN) 300 MG Cap; Take 1 Capsule by mouth 3 times a day for 10 days.  Dispense: 30 Capsule; Refill: 0  - HYDROcodone-acetaminophen (NORCO) 5-325 MG Tab per tablet; 1 TAB BY MOUTH EVERY 6 HOURS ONLY IF NEEDED FOR PAIN FOR UP TO 2 DAYS. MAY CAUSE DROWSINESS.  Dispense: 8 Tablet; Refill: 0       Discussed with her DDX, management options, and risks, benefits, and alternatives to treatment plan agreed upon.    Recently broke an upper tooth and has become painful. Clindamycin 300 mg TID and Hydrocodone 5-325 mg worked and were tolerated for other dental infection on 10/17/21, seen by other provider, visit reviewed.    Upper tooth, just left of midline is broken down to be flush with the gum.    Agreeable to medications prescribed.  report checked - last Rx was Hydrocodone-APAP 5-325 mg #8 on 10/17/21.    In my professional judgment, after considering each of the factors set forth in , the CS is medically necessary and " appropriate.    Advised to see Dentist for definitive treatment.    Will return to urgent care if needed.

## 2023-12-04 DIAGNOSIS — G40.909 SEIZURE DISORDER (HCC): ICD-10-CM

## 2023-12-04 RX ORDER — LEVETIRACETAM 500 MG/1
1000 TABLET ORAL 2 TIMES DAILY
Qty: 360 TABLET | Refills: 3 | Status: SHIPPED | OUTPATIENT
Start: 2023-12-04 | End: 2024-01-16 | Stop reason: SDUPTHER

## 2023-12-04 NOTE — TELEPHONE ENCOUNTER
Received request via: Pharmacy    Was the patient seen in the last year in this department? No   Date of last office visit 05/17/2021     Per last Neurology Office Visit, when was the date of next follow up visit set for?                            Date of office visit follow up request 9 months    Does the patient have an upcoming appointment? No   If yes, when?     Does the patient have an active prescription (recently filled or refills available) for medication(s) requested? No    Does the patient have [facility] and need 100 day supply (blood pressure, diabetes and cholesterol meds only)? Patient does not have SCP

## 2023-12-05 ENCOUNTER — TELEPHONE (OUTPATIENT)
Dept: NEUROLOGY | Facility: MEDICAL CENTER | Age: 68
End: 2023-12-05
Payer: MEDICARE

## 2023-12-05 NOTE — TELEPHONE ENCOUNTER
Received Refill PA request via [facility]  for Levetiracetam (Keppra) 500 MG Tabs. (Quantity:360, Day Supply:90) - Copay $4.15     Insurance: [employer]  Member ID:  [id number]  BIN: [id number]  PCN: [id number]  Group: [id number]     Ran Test claim via [place] [facility] medication Pays for a $4.15 copay. Will outreach to patient to offer specialty pharmacy services and or release to preferred pharmacy

## 2023-12-20 ENCOUNTER — TELEPHONE (OUTPATIENT)
Dept: NEUROLOGY | Facility: MEDICAL CENTER | Age: 68
End: 2023-12-20

## 2023-12-20 NOTE — TELEPHONE ENCOUNTER
Greetings!!     I spoke with Beatrice and she'd like to switch levETIRAcetam (KEPPRA) 500 MG Tab to [facility].  Please send new prescriptions for the following medications.  Unfortunately we've been unable to transfer the prescriptions from the current pharmacy.        -levETIRAcetam (KEPPRA) 500 MG Tab    Thank you!      Bita Kelley  Rx Coordinator   [phone]

## 2024-01-15 ENCOUNTER — TELEPHONE (OUTPATIENT)
Dept: NEUROLOGY | Facility: MEDICAL CENTER | Age: 69
End: 2024-01-15
Payer: MEDICARE

## 2024-01-15 NOTE — TELEPHONE ENCOUNTER
Called patient scheduled patient for 03/08/2024 with Dr Mayo. She does not have enough medication to last until then. She wants to use [facility] in [place]. Patient does not drive. I told her I would request script to last until her appointment but not sure if possible since we haven't see her in 2 years.     I sent request to  who   Bita Kelley  Rx coordinator  [facility]    Who has been in touch with her.

## 2024-01-16 DIAGNOSIS — G40.909 SEIZURE DISORDER (HCC): ICD-10-CM

## 2024-01-16 RX ORDER — LEVETIRACETAM 500 MG/1
1000 TABLET ORAL 2 TIMES DAILY
Qty: 360 TABLET | Refills: 3 | Status: SHIPPED | OUTPATIENT
Start: 2024-01-16 | End: 2025-01-10

## 2024-01-16 NOTE — TELEPHONE ENCOUNTER
Received request via: Patient    Was the patient seen in the last year in this department? No    Does the patient have an active prescription (recently filled or refills available) for medication(s) requested? No    Does the patient have [facility] and need 100 day supply (blood pressure, diabetes and cholesterol meds only)? Patient does not have SCP    Patient last saw Dr Lancaster 2 years ago. She has appointment  with Dr Mayo on 03/08/2024. She does not have pcp and will not have enough meds to last.    Please advise

## 2024-01-23 RX ORDER — LEVETIRACETAM 500 MG/1
1000 TABLET ORAL 2 TIMES DAILY
Qty: 360 TABLET | Refills: 1 | Status: SHIPPED | OUTPATIENT
Start: 2024-01-23 | End: 2024-03-08

## 2024-03-08 ENCOUNTER — OFFICE VISIT (OUTPATIENT)
Dept: NEUROLOGY | Facility: MEDICAL CENTER | Age: 69
End: 2024-03-08
Attending: STUDENT IN AN ORGANIZED HEALTH CARE EDUCATION/TRAINING PROGRAM
Payer: MEDICARE

## 2024-03-08 ENCOUNTER — PHARMACY VISIT (OUTPATIENT)
Dept: PHARMACY | Facility: MEDICAL CENTER | Age: 69
End: 2024-03-08
Payer: COMMERCIAL

## 2024-03-08 VITALS
OXYGEN SATURATION: 95 % | SYSTOLIC BLOOD PRESSURE: 122 MMHG | BODY MASS INDEX: 26.92 KG/M2 | WEIGHT: 161.6 LBS | HEART RATE: 87 BPM | HEIGHT: 65 IN | TEMPERATURE: 98.6 F | DIASTOLIC BLOOD PRESSURE: 70 MMHG | RESPIRATION RATE: 14 BRPM

## 2024-03-08 DIAGNOSIS — G40.909 SEIZURE DISORDER (HCC): ICD-10-CM

## 2024-03-08 DIAGNOSIS — F41.9 ANXIETY: ICD-10-CM

## 2024-03-08 DIAGNOSIS — Z65.8 PSYCHOSOCIAL STRESSORS: ICD-10-CM

## 2024-03-08 DIAGNOSIS — Z00.00 HEALTHCARE MAINTENANCE: ICD-10-CM

## 2024-03-08 DIAGNOSIS — G47.00 INSOMNIA, UNSPECIFIED TYPE: ICD-10-CM

## 2024-03-08 DIAGNOSIS — F41.0 PANIC ATTACK: ICD-10-CM

## 2024-03-08 DIAGNOSIS — F17.200 TOBACCO USE DISORDER: ICD-10-CM

## 2024-03-08 DIAGNOSIS — Z78.9 REGULAR DRINKER OF ALCOHOL: ICD-10-CM

## 2024-03-08 DIAGNOSIS — Z87.898 HISTORY OF ALCOHOL USE DISORDER: ICD-10-CM

## 2024-03-08 PROCEDURE — 3078F DIAST BP <80 MM HG: CPT | Performed by: STUDENT IN AN ORGANIZED HEALTH CARE EDUCATION/TRAINING PROGRAM

## 2024-03-08 PROCEDURE — 99214 OFFICE O/P EST MOD 30 MIN: CPT | Performed by: STUDENT IN AN ORGANIZED HEALTH CARE EDUCATION/TRAINING PROGRAM

## 2024-03-08 PROCEDURE — 99211 OFF/OP EST MAY X REQ PHY/QHP: CPT | Performed by: STUDENT IN AN ORGANIZED HEALTH CARE EDUCATION/TRAINING PROGRAM

## 2024-03-08 PROCEDURE — RXMED WILLOW AMBULATORY MEDICATION CHARGE: Performed by: STUDENT IN AN ORGANIZED HEALTH CARE EDUCATION/TRAINING PROGRAM

## 2024-03-08 PROCEDURE — 3074F SYST BP LT 130 MM HG: CPT | Performed by: STUDENT IN AN ORGANIZED HEALTH CARE EDUCATION/TRAINING PROGRAM

## 2024-03-08 RX ORDER — MIRTAZAPINE 15 MG/1
15 TABLET, FILM COATED ORAL NIGHTLY
Qty: 30 TABLET | Refills: 5 | Status: SHIPPED | OUTPATIENT
Start: 2024-03-08 | End: 2024-09-04

## 2024-03-08 ASSESSMENT — FIBROSIS 4 INDEX: FIB4 SCORE: 1.03

## 2024-03-08 ASSESSMENT — PATIENT HEALTH QUESTIONNAIRE - PHQ9: CLINICAL INTERPRETATION OF PHQ2 SCORE: 0

## 2024-03-08 NOTE — PATIENT INSTRUCTIONS
NEUROLOGY CLINIC VISIT WITH DR. MAYO     PLEASE READ THIS ENTIRE DOCUMENT CAREFULLY AND COMPLETELY:    First and foremost, you matter to Dr. Mayo and you deserve the best care.   Dr. Mayo prides himself on providing the best possible care to all his patients. He strives to make each appointment meaningful, so that all your concerns are being addressed and all your neurological problems are being optimally treated. In order to achieve these goals for everyone, Dr. Mayo has listed important reminders and the best ways to prepare for each appointment. Please read each item carefully. Thank you!    Due to the high volume of patients we are trying to help, your physician will not be able to respond by phone or in [employer] to your routine concerns between appointments.  This does not reflect a lack of interest or concern for you or your diagnosis.  Please bring these questions and concerns to your appointment where your physician can answer.  Please relay more pressing concerns to our office, either via [employer], or by phone; if not able to reach us please visit nearby Urgent Care Center or Emergency Department.  If any emergent medical needs, please seek emergent medical help and/or call 911.    Also, please note that we are not able to fill out paperwork that might be related to your work, utility company, disability, and/or driving, among others, in between the visits.  Please schedule a dedicated appointment to address any and all paperwork.  This is not due to lack of concern or interest for your disease-related work/administrative problems, but to make sure that we provide the best possible care and to fill out your paperwork in a correct, complete, and timely manner.  ------------------------------------------------------------------------------------------  Please let our office know if you have any changes in your seizure frequency and/characteristics.     Please keep a diary of your seizures and bring it  with you to each appointment.    Please take vitamin D3 [id number] internation units daily.     Please abstain from driving until further notice    If you are a biological female with epilepsy who is of reproductive age, who is actively breastfeeding, and/or who infants/young children:  Please take folic acid 1 mg daily. This is an over-the-counter supplement that is recommended to prevent certain developmental problems in your baby, in case you become pregnant in the future.  It is critical that you let our office know as soon as you become pregnant or plan to become pregnant.  If you are caring for a baby/young child, please make sure to be sitting on a soft surface while holding your baby/young child, so in case you have a seizure, your baby/young child is not injured due to fall.   Please let us know if, while breastfeeding, you observe that your baby is excessively sleepy and/or has other behavioral changes. Because many antiseizure medications are collected in breast milk, some nursing babies can suffer adverse medication effects.    Please note that the following might precipitate seizures:   missed doses of antiseizure medications  being sick with a fever, stress  Fatigue  sleep deprivation or abnormal sleeping patterns  not eating regularly  not drinking enough water  drinking too much alcohol  stopping alcohol suddenly if you are currently using it on a regular/daily basis,   using recreational drugs, among others.    Please note that the following might lead to an injury or even be life-threatening in the event you have a seizure and/or lose awareness while:  being in a large body of water by yourself, such as bath, pool, lake, ocean, among others (risk of drowning)  being on unprotected heights (risk of fall)  being around and/or operating heavy machinery (risk of injury)  being around open fire/hot surfaces (risk of burns)  any other activities/circumstances, in which if you lose awareness, you might  injure yourself and/or others.  -------------------------------------------------------------------------------------------  SUDEP (SUDDEN UNEXPECTED DEATH IN EPILEPSY)  It is important that your seizures are well controlled and you have none or have them rarely. In addition to avoiding injury related to breakthrough seizures, frequent seizures increase risk of SUDEP (sudden unexpected death in epilepsy), where a person goes into a seizure and then never wakes up. The best way to prevent SUDEP is to control your seizures well.   ------------------------------------------------------------------------------------------  Please call for help (crisis line and/or 911) in case you have thoughts of harming yourself and/or others.  ------------------------------------------------------------------------------------------  INSTRUCTIONS FOR YOUR FAMILY/CAREGIVERS:  Please call 911 if the patient has a seizure longer than 2-3 minutes, if seizures are back to back without her recovering to her baseline, or she does not start recovering within 5-10 minutes after the seizure stops. During the seizure - please turn her on her side, please make sure her head is protected (for example, you should put a pillow under her head, if one is available), and please do not put anything in her mouth.   ------------------------------------------------------------------------------------------  PATIENT EXPECTATIONS,  IMPORTANT APPOINTMENT REMINDERS, AND ADDITIONAL HELPFUL TIPS:   REFILLS:   Request refills AT LEAST 1 week in advance to ensure you do not run out of medications    MyChart  It is STRONGLY encouraged that ALL patients sign up for MyChart. It is BY FAR the fastest and most convenient way for both Dr. Mayo and patients to obtain timely refills.  If you are having trouble signing up or logging into your account, staff are available to help you. Please ask a medical assistant or staff at the  to assist you.    TEST RESULS:    All labs and diagnostic test results will be reviewed at your next visit, UNLESS  Dr. Mayo determines that there are important findings on the tests need to be acted on sooner. Dr. Mayo will either call or send a message through [employer] if this is the case.    BE PREPARED PRIOR TO EVERY APPOINTMENT:  All patient are responsible for ensuring that ALL test results that were completed outside of the [facility] system have been received by our Neurology Department PRIOR to your appointment with Dr. Mayo.    IMPORTANT:  ALL images (not just the reports) must be sent and uploaded to the [employer] system. Dr. Mayo reviews all images personally prior to each visit. Ensuring that ALL the test results and test images are accessible to Dr. Mayo prior to your appointment is YOUR responsibility and an important part of making the most out of each appointment.   Bring a government-issues picture ID and an updated insurance card EVERY visit.  It is highly recommended that you bring at every visit a list of the most important topics that you want address. While it may not be possible to address all items on the list in a single visit, preparing a list will ensure that Dr. Mayo addresses the items that are most important to you and your health    PAPERWORK, DOCUMENTATION, LETTER REQUESTS:  You must notify the office ahead of your appointment of all paperwork or letter requests.   Please DO NOT wait until the last minute to make these requests. Please give all paperwork to the medical assistant at the start of the appointment and check-in process. Please note that Dr. Mayo may not be able complete some types of documentation in a single appointment or even within a single day or week. This is why it is important to communicate paperwork requests prior to your appointment and at least 2 weeks prior to any deadlines.    KNOW ALL YOU MEDICATIONS:   AT EVERY SINGLE APPOINTMENT, please bring a list of every single prescribed,  non-prescribed, and over the counter medication or supplements you are taking, including ones taken on a rare or intermittent basis.  Include the following information for each prescribed or non-prescribed medications:  Name of medication   The strength of EACH pill/capsule/tablet, etc.   The number of pills/capsules/tablets, etc taken per dose  The number and time of day that doses are taken  For every single Supplement that you take on a routine or intermittent basis, you must include:  The Brand Name   A complete list of every single ingredient, compound, vitamin, and/or mineral in each dose, along with the corresponding amounts/strengths of all ingredients, vitamins, minerals, etc., if such information is provided or known  The number of doses taken per day and time of day doses are taken  If medications are taken on an intermittent or as needed basis, please estimate how many days per week or days per month the medications are used  DO NOT just print out your medication list from [employer] or bring a list from a prior appointment or hospitalizations because the information is often often unreliable, inaccurate, outdated, and/or incomplete   The list should be printed or written  If you forget or do not have a list of all the medication, then it is acceptable, although less preferred, to bring all the bottles to the appointment     ARRIVE EARLY FOR ALL VISITS:  Please note that we are unable to accommodate late arrivals as per office policy.  YOU-the patient - (NOT a parent, spouse, or friend) must be physically present at check-in no later than 12 minutes after the scheduled appointment time, or you will be asked to reschedule   Consider scheduling a virtual appointment with Dr. Mayo through [employer] as an alternative if transportation to the clinic is difficult or unavailable   Please note, however, that virtual visits can only be scheduled after being an established patient of Dr. Mayo. All new appointments  "must be done in-person in clinic  Some insurances will not cover the cost of virtual appointments. Please check with your insurance to find out if these visits are covered    COMMUNICATING URGENT AND NON-URGENT MATTERS:  Your concerns are important and deserve to be heard and addressed. If you have an urgent matter, there are two methods that will ensure your concerns are prioritized appropriately:   Preferred method: Sign-up/Login to your [employer] account and send a message addressed to Dr. Mayo or Latha Albert (Dr. Mayo's assistant). In the subject line, type \"urgent\" followed by a word or phrase describing the situation (For example, write \"Urgent: Out of antiseuzre med and need refill\" or \"Urgent: Severe side effects to new meds\". In doing this, our staff can ensure urgent messages are triaged appropriately and communicated to Dr. Mayo that day.  Call [facility] main line at [phone]. Dr. Mayo's voicemail extension is [phone]. When leaving a voice message, specifically indicate if it is urgent (or non-urgent) so that the matter can be triaged appropriately and addressed in a timely manner    Thank you for entrusting your neurological care to [facility] and we look forward to continuing to serve you.   "

## 2024-03-08 NOTE — PROGRESS NOTES
"NEUROLOGY NEW PATIENT ENCOUNTER - 03/08/2024   REFERRING PROVIDER:  No referring provider defined for this encounter.      REASON FOR VISIT: Beatrice Caraballo 69 y.o. female presents today to establish care with me.    SUMMARY OF PROBLEMS AND/OR DIAGNOSES:  Patient with a history of epilepsy who has been referred to me to establish care. She was previously seen here by Dr. Lancaster in Nov 2021 and Elda Santana before that. Per Dr. Lancaster's note which aptly summarizes pertinent aspects of her medical history and which I have also confirmed to be accurate and true:    \"IMPRESSION:  1. 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 Etiology/Syndrome- genetic   3. EEG ajgktuxy-0-2 Hz genearlized dischares   4. LEV 1000/1000    HISTORY:  Dominant hand: right handed      In brief, her pertinent medical history is remarkable for genearlized epilepsy based on EEG finding      Seizure type 1 - convulsion   The onset of seizure was in her 30s, the ictal behavior was stereotyped and described as  No warning except feeling strange, convulsion, fall. Some injuries were reported.   At worst 5 seizures over a few months.   The last seizure was in mid-March 2019.  In 2017 when her medication was changed with resultant mulitiple seizures. She broke teeth and some windows.      The longest seizure free period was 2 year   The seizures were isolated but recurred occasionally up to X times in one day. There was               no cluster of seizures,               no history of status epilepticus     Special features:  They were noted only during the wake, there was no specific timing.      Potential triggering factors were reviewed              Sleep deprivation              Alcohol              Stress              Periods              Other   No epilepsy risk factors        Current AEDs:  LEV 1000/1000      " "  Previous workup:  1. EEG data: 4-5 Hz generalized discharges up to 5 seconds (8/2019)  2. Neuroimaging data: n/a  3. Recent AED level:     Prior antiepileptic medications were reviewed  Pb  LEV  PHT  VPA     Antiepileptic medications most useful: LEV \"    Patient reports being seizure-free still. No seizures.        Patient's PMH, PSH, FH, SH, allergies, and medications were reviewed:   has a past medical history of Seizure (HCC) and Seizure (HCC).    has a past surgical history that includes arthroplasty.   family history includes Cancer in her sister.    reports that she has been smoking cigarettes. She has never used smokeless tobacco. She reports current alcohol use. She reports current drug use. Drug: Intravenous.     ROS negative except that which was mentioned above    CURRENT MEDICATIONS AT THE TIME OF THIS ENCOUNTER:    Current Outpatient Medications:     mirtazapine, 15 mg, Oral, Nightly    levETIRAcetam, 1,000 mg, Oral, BID, Taking     EXAM:   Ambulatory Vitals:  /70 (BP Location: Left arm, Patient Position: Sitting, BP Cuff Size: Adult)   Pulse 87   Temp 37 °C (98.6 °F) (Temporal)   Resp 14   Ht 1.651 m (5' 5\")   Wt 73.3 kg (161 lb 9.6 oz)   SpO2 95%    Physical Exam:  Physical Exam  Constitutional:       General: She is not in acute distress.  HENT:      Head: Normocephalic.      Nose: Nose normal.      Mouth/Throat:      Mouth: Mucous membranes are dry.   Skin:     General: Skin is dry.      Capillary Refill: Capillary refill takes 2 to 3 seconds.      Coloration: Skin is pale.   Psychiatric:         Speech: Speech normal.         Behavior: Behavior is cooperative.        Neurological Exam   Neurological Exam  Mental Status  Drowsy. Speech is normal.  Patient appeared tired, distracted. Answered questions appropriately but affect was rather flat..       RELEVANT DATA PERSONALLY REVIEWED:       ASSESSMENT, EDUCATION, COUNSELING:  This is a 69 y.o. female patient who presents to the " neurology clinic. We had an extensive discussion about the patient's symptoms, signs, and work-up to date, if any. We discussed potential and/or definitive diagnoses, work-up, and potential treatments.       PLAN:  If applicable, the work-up such as labs, imaging, procedures, and/or other testing, referrals, and/or recommended treatment strategies are listed below.  Visit Diagnoses     ICD-10-CM   1. Seizure disorder (HCC)  G40.909   2. Regular drinker of alcohol  Z78.9   3. Insomnia, unspecified type  G47.00   4. Psychosocial stressors  Z65.8   5. Anxiety  F41.9   6. Panic attack  F41.0   7. Tobacco use disorder  F17.200   8. History of alcohol use disorder  Z87.898   9. 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   minutes of face-to-face time in this visit. Over 50% of the time of the visit today was spent on counseling and/or coordination of care wtih the patient and/or family, as above in assessment in plan.    Layo Mayo MD  Epilepsy and General Neurology  Department of Neurology  Clinical  of Neurology [facility]  [facility].

## 2024-03-28 ENCOUNTER — TELEPHONE (OUTPATIENT)
Dept: NEUROLOGY | Facility: MEDICAL CENTER | Age: 69
End: 2024-03-28
Payer: MEDICARE

## 2024-04-08 ENCOUNTER — HOSPITAL ENCOUNTER (OUTPATIENT)
Dept: LAB | Facility: MEDICAL CENTER | Age: 69
End: 2024-04-08
Attending: STUDENT IN AN ORGANIZED HEALTH CARE EDUCATION/TRAINING PROGRAM
Payer: MEDICARE

## 2024-04-08 DIAGNOSIS — G40.909 SEIZURE DISORDER (HCC): ICD-10-CM

## 2024-04-08 DIAGNOSIS — Z78.9 REGULAR DRINKER OF ALCOHOL: ICD-10-CM

## 2024-04-08 LAB
25(OH)D3 SERPL-MCNC: 31 NG/ML (ref 30–100)
ALBUMIN SERPL BCP-MCNC: 4.6 G/DL (ref 3.2–4.9)
ALBUMIN/GLOB SERPL: 1.4 G/DL
ALP SERPL-CCNC: 73 U/L (ref 30–99)
ALT SERPL-CCNC: 35 U/L (ref 2–50)
ANION GAP SERPL CALC-SCNC: 15 MMOL/L (ref 7–16)
AST SERPL-CCNC: 39 U/L (ref 12–45)
BASOPHILS # BLD AUTO: 1.7 % (ref 0–1.8)
BASOPHILS # BLD: 0.09 K/UL (ref 0–0.12)
BILIRUB SERPL-MCNC: <0.2 MG/DL (ref 0.1–1.5)
BUN SERPL-MCNC: 18 MG/DL (ref 8–22)
CALCIUM ALBUM COR SERPL-MCNC: 8.4 MG/DL (ref 8.5–10.5)
CALCIUM SERPL-MCNC: 8.9 MG/DL (ref 8.5–10.5)
CHLORIDE SERPL-SCNC: 105 MMOL/L (ref 96–112)
CO2 SERPL-SCNC: 23 MMOL/L (ref 20–33)
CREAT SERPL-MCNC: 0.63 MG/DL (ref 0.5–1.4)
EOSINOPHIL # BLD AUTO: 0.16 K/UL (ref 0–0.51)
EOSINOPHIL NFR BLD: 3 % (ref 0–6.9)
ERYTHROCYTE [DISTWIDTH] IN BLOOD BY AUTOMATED COUNT: 51.6 FL (ref 35.9–50)
FOLATE SERPL-MCNC: 3 NG/ML
GFR SERPLBLD CREATININE-BSD FMLA CKD-EPI: 95 ML/MIN/1.73 M 2
GLOBULIN SER CALC-MCNC: 3.4 G/DL (ref 1.9–3.5)
GLUCOSE SERPL-MCNC: 81 MG/DL (ref 65–99)
HCT VFR BLD AUTO: 51.6 % (ref 37–47)
HGB BLD-MCNC: 17 G/DL (ref 12–16)
IMM GRANULOCYTES # BLD AUTO: 0.02 K/UL (ref 0–0.11)
IMM GRANULOCYTES NFR BLD AUTO: 0.4 % (ref 0–0.9)
LYMPHOCYTES # BLD AUTO: 1.47 K/UL (ref 1–4.8)
LYMPHOCYTES NFR BLD: 27.6 % (ref 22–41)
MCH RBC QN AUTO: 33.3 PG (ref 27–33)
MCHC RBC AUTO-ENTMCNC: 32.9 G/DL (ref 32.2–35.5)
MCV RBC AUTO: 101 FL (ref 81.4–97.8)
MONOCYTES # BLD AUTO: 0.76 K/UL (ref 0–0.85)
MONOCYTES NFR BLD AUTO: 14.3 % (ref 0–13.4)
NEUTROPHILS # BLD AUTO: 2.83 K/UL (ref 1.82–7.42)
NEUTROPHILS NFR BLD: 53 % (ref 44–72)
NRBC # BLD AUTO: 0 K/UL
NRBC BLD-RTO: 0 /100 WBC (ref 0–0.2)
PLATELET # BLD AUTO: 311 K/UL (ref 164–446)
PMV BLD AUTO: 10 FL (ref 9–12.9)
POTASSIUM SERPL-SCNC: 3.9 MMOL/L (ref 3.6–5.5)
PROT SERPL-MCNC: 8 G/DL (ref 6–8.2)
RBC # BLD AUTO: 5.11 M/UL (ref 4.2–5.4)
SODIUM SERPL-SCNC: 143 MMOL/L (ref 135–145)
T3FREE SERPL-MCNC: 4.12 PG/ML (ref 2–4.4)
T4 FREE SERPL-MCNC: 1.48 NG/DL (ref 0.93–1.7)
TSH SERPL DL<=0.005 MIU/L-ACNC: 1.37 UIU/ML (ref 0.38–5.33)
VIT B12 SERPL-MCNC: 736 PG/ML (ref 211–911)
WBC # BLD AUTO: 5.3 K/UL (ref 4.8–10.8)

## 2024-04-08 PROCEDURE — 84443 ASSAY THYROID STIM HORMONE: CPT | Mod: GA

## 2024-04-08 PROCEDURE — 80053 COMPREHEN METABOLIC PANEL: CPT

## 2024-04-08 PROCEDURE — 82306 VITAMIN D 25 HYDROXY: CPT | Mod: GA

## 2024-04-08 PROCEDURE — 82607 VITAMIN B-12: CPT

## 2024-04-08 PROCEDURE — 36415 COLL VENOUS BLD VENIPUNCTURE: CPT | Mod: GA

## 2024-04-08 PROCEDURE — 85025 COMPLETE CBC W/AUTO DIFF WBC: CPT

## 2024-04-08 PROCEDURE — 84439 ASSAY OF FREE THYROXINE: CPT | Mod: GA

## 2024-04-08 PROCEDURE — 84481 FREE ASSAY (FT-3): CPT

## 2024-04-08 PROCEDURE — 82746 ASSAY OF FOLIC ACID SERUM: CPT

## 2024-04-08 PROCEDURE — 84425 ASSAY OF VITAMIN B-1: CPT

## 2024-04-12 LAB — VIT B1 BLD-MCNC: 166 NMOL/L (ref 70–180)

## 2024-10-31 ENCOUNTER — OFFICE VISIT (OUTPATIENT)
Dept: URGENT CARE | Facility: PHYSICIAN GROUP | Age: 69
End: 2024-10-31
Payer: MEDICARE

## 2024-10-31 VITALS
HEART RATE: 68 BPM | RESPIRATION RATE: 16 BRPM | TEMPERATURE: 98.1 F | HEIGHT: 65 IN | BODY MASS INDEX: 25.66 KG/M2 | DIASTOLIC BLOOD PRESSURE: 60 MMHG | WEIGHT: 154 LBS | OXYGEN SATURATION: 93 % | SYSTOLIC BLOOD PRESSURE: 122 MMHG

## 2024-10-31 DIAGNOSIS — K04.7 DENTAL INFECTION: ICD-10-CM

## 2024-10-31 DIAGNOSIS — K08.9 CHRONIC DENTAL PAIN: ICD-10-CM

## 2024-10-31 DIAGNOSIS — G89.29 CHRONIC DENTAL PAIN: ICD-10-CM

## 2024-10-31 RX ORDER — CLINDAMYCIN HYDROCHLORIDE 300 MG/1
300 CAPSULE ORAL 3 TIMES DAILY
Qty: 30 CAPSULE | Refills: 0 | Status: SHIPPED | OUTPATIENT
Start: 2024-10-31

## 2024-10-31 RX ORDER — LIDOCAINE HYDROCHLORIDE 20 MG/ML
15 SOLUTION OROPHARYNGEAL EVERY 6 HOURS PRN
Qty: 140 ML | Refills: 0 | Status: SHIPPED | OUTPATIENT
Start: 2024-10-31 | End: 2024-11-07

## 2024-10-31 RX ORDER — LIDOCAINE HYDROCHLORIDE 20 MG/ML
15 SOLUTION OROPHARYNGEAL PRN
Qty: 1200 ML | Status: CANCELLED | OUTPATIENT
Start: 2024-10-31

## 2024-10-31 RX ORDER — IBUPROFEN 800 MG/1
800 TABLET, FILM COATED ORAL EVERY 8 HOURS PRN
Qty: 30 TABLET | Refills: 0 | Status: SHIPPED | OUTPATIENT
Start: 2024-10-31

## 2024-10-31 RX ORDER — KETOROLAC TROMETHAMINE 15 MG/ML
15 INJECTION, SOLUTION INTRAMUSCULAR; INTRAVENOUS ONCE
Status: COMPLETED | OUTPATIENT
Start: 2024-10-31 | End: 2024-10-31

## 2024-10-31 RX ADMIN — KETOROLAC TROMETHAMINE 15 MG: 15 INJECTION, SOLUTION INTRAMUSCULAR; INTRAVENOUS at 17:01

## 2024-10-31 ASSESSMENT — ENCOUNTER SYMPTOMS
NECK PAIN: 0
FEVER: 0
COUGH: 0
NAUSEA: 0
VOMITING: 0
SINUS PAIN: 0
MYALGIAS: 0
SORE THROAT: 0
CHILLS: 0
HEADACHES: 0

## 2024-10-31 ASSESSMENT — FIBROSIS 4 INDEX: FIB4 SCORE: 1.5

## 2025-02-08 ENCOUNTER — APPOINTMENT (OUTPATIENT)
Dept: RADIOLOGY | Facility: MEDICAL CENTER | Age: 70
End: 2025-02-08
Payer: MEDICARE

## 2025-02-08 ENCOUNTER — APPOINTMENT (OUTPATIENT)
Dept: RADIOLOGY | Facility: MEDICAL CENTER | Age: 70
End: 2025-02-08
Attending: EMERGENCY MEDICINE
Payer: MEDICARE

## 2025-02-08 ENCOUNTER — HOSPITAL ENCOUNTER (OUTPATIENT)
Facility: MEDICAL CENTER | Age: 70
End: 2025-02-09
Attending: EMERGENCY MEDICINE | Admitting: INTERNAL MEDICINE
Payer: MEDICARE

## 2025-02-08 DIAGNOSIS — R91.1 PULMONARY NODULE: ICD-10-CM

## 2025-02-08 DIAGNOSIS — I48.92 ATRIAL FLUTTER, UNSPECIFIED TYPE (HCC): ICD-10-CM

## 2025-02-08 DIAGNOSIS — J18.9 PNEUMONIA OF RIGHT UPPER LOBE DUE TO INFECTIOUS ORGANISM: ICD-10-CM

## 2025-02-08 DIAGNOSIS — R07.9 ACUTE CHEST PAIN: ICD-10-CM

## 2025-02-08 PROBLEM — D72.829 LEUKOCYTOSIS: Status: ACTIVE | Noted: 2025-02-08

## 2025-02-08 PROBLEM — F17.200 SMOKER: Status: ACTIVE | Noted: 2025-02-08

## 2025-02-08 PROBLEM — D72.829 LEUKOCYTOSIS: Status: RESOLVED | Noted: 2025-02-08 | Resolved: 2025-02-08

## 2025-02-08 LAB
ALBUMIN SERPL BCP-MCNC: 3.8 G/DL (ref 3.2–4.9)
ALBUMIN/GLOB SERPL: 1.2 G/DL
ALP SERPL-CCNC: 72 U/L (ref 30–99)
ALT SERPL-CCNC: 52 U/L (ref 2–50)
ANION GAP SERPL CALC-SCNC: 14 MMOL/L (ref 7–16)
APPEARANCE UR: CLEAR
AST SERPL-CCNC: 46 U/L (ref 12–45)
BASOPHILS # BLD AUTO: 0.3 % (ref 0–1.8)
BASOPHILS # BLD: 0.06 K/UL (ref 0–0.12)
BILIRUB SERPL-MCNC: 0.5 MG/DL (ref 0.1–1.5)
BILIRUB UR QL STRIP.AUTO: NEGATIVE
BUN SERPL-MCNC: 13 MG/DL (ref 8–22)
CALCIUM ALBUM COR SERPL-MCNC: 9.1 MG/DL (ref 8.5–10.5)
CALCIUM SERPL-MCNC: 8.9 MG/DL (ref 8.5–10.5)
CHLORIDE SERPL-SCNC: 99 MMOL/L (ref 96–112)
CO2 SERPL-SCNC: 20 MMOL/L (ref 20–33)
COLOR UR: YELLOW
CREAT SERPL-MCNC: 0.72 MG/DL (ref 0.5–1.4)
D DIMER PPP IA.FEU-MCNC: 1.03 UG/ML (FEU) (ref 0–0.5)
EKG IMPRESSION: NORMAL
EOSINOPHIL # BLD AUTO: 0.01 K/UL (ref 0–0.51)
EOSINOPHIL NFR BLD: 0.1 % (ref 0–6.9)
ERYTHROCYTE [DISTWIDTH] IN BLOOD BY AUTOMATED COUNT: 52.4 FL (ref 35.9–50)
GFR SERPLBLD CREATININE-BSD FMLA CKD-EPI: 89 ML/MIN/1.73 M 2
GLOBULIN SER CALC-MCNC: 3.3 G/DL (ref 1.9–3.5)
GLUCOSE SERPL-MCNC: 99 MG/DL (ref 65–99)
GLUCOSE UR STRIP.AUTO-MCNC: NEGATIVE MG/DL
HCT VFR BLD AUTO: 44.2 % (ref 37–47)
HGB BLD-MCNC: 15.2 G/DL (ref 12–16)
IMM GRANULOCYTES # BLD AUTO: 0.09 K/UL (ref 0–0.11)
IMM GRANULOCYTES NFR BLD AUTO: 0.5 % (ref 0–0.9)
KETONES UR STRIP.AUTO-MCNC: 15 MG/DL
LEUKOCYTE ESTERASE UR QL STRIP.AUTO: NEGATIVE
LIPASE SERPL-CCNC: 13 U/L (ref 11–82)
LIPASE SERPL-CCNC: 15 U/L (ref 11–82)
LYMPHOCYTES # BLD AUTO: 0.82 K/UL (ref 1–4.8)
LYMPHOCYTES NFR BLD: 4.2 % (ref 22–41)
MAGNESIUM SERPL-MCNC: 1.5 MG/DL (ref 1.5–2.5)
MCH RBC QN AUTO: 34.3 PG (ref 27–33)
MCHC RBC AUTO-ENTMCNC: 34.4 G/DL (ref 32.2–35.5)
MCV RBC AUTO: 99.8 FL (ref 81.4–97.8)
MICRO URNS: ABNORMAL
MONOCYTES # BLD AUTO: 1.57 K/UL (ref 0–0.85)
MONOCYTES NFR BLD AUTO: 8.1 % (ref 0–13.4)
NEUTROPHILS # BLD AUTO: 16.93 K/UL (ref 1.82–7.42)
NEUTROPHILS NFR BLD: 86.8 % (ref 44–72)
NITRITE UR QL STRIP.AUTO: NEGATIVE
NRBC # BLD AUTO: 0 K/UL
NRBC BLD-RTO: 0 /100 WBC (ref 0–0.2)
NT-PROBNP SERPL IA-MCNC: 1120 PG/ML (ref 0–125)
PH UR STRIP.AUTO: 6.5 [PH] (ref 5–8)
PLATELET # BLD AUTO: 268 K/UL (ref 164–446)
PMV BLD AUTO: 10.1 FL (ref 9–12.9)
POTASSIUM SERPL-SCNC: 3.5 MMOL/L (ref 3.6–5.5)
PROCALCITONIN SERPL-MCNC: 0.37 NG/ML
PROT SERPL-MCNC: 7.1 G/DL (ref 6–8.2)
PROT UR QL STRIP: NEGATIVE MG/DL
RBC # BLD AUTO: 4.43 M/UL (ref 4.2–5.4)
RBC UR QL AUTO: NEGATIVE
SODIUM SERPL-SCNC: 133 MMOL/L (ref 135–145)
SP GR UR STRIP.AUTO: >1.045
TROPONIN T SERPL-MCNC: 8 NG/L (ref 6–19)
UROBILINOGEN UR STRIP.AUTO-MCNC: 1 EU/DL
WBC # BLD AUTO: 19.5 K/UL (ref 4.8–10.8)

## 2025-02-08 PROCEDURE — 80053 COMPREHEN METABOLIC PANEL: CPT

## 2025-02-08 PROCEDURE — 71275 CT ANGIOGRAPHY CHEST: CPT

## 2025-02-08 PROCEDURE — G0378 HOSPITAL OBSERVATION PER HR: HCPCS

## 2025-02-08 PROCEDURE — 83690 ASSAY OF LIPASE: CPT | Mod: 91

## 2025-02-08 PROCEDURE — 700101 HCHG RX REV CODE 250

## 2025-02-08 PROCEDURE — 84484 ASSAY OF TROPONIN QUANT: CPT | Mod: 91

## 2025-02-08 PROCEDURE — 84145 PROCALCITONIN (PCT): CPT

## 2025-02-08 PROCEDURE — 83735 ASSAY OF MAGNESIUM: CPT

## 2025-02-08 PROCEDURE — 700102 HCHG RX REV CODE 250 W/ 637 OVERRIDE(OP)

## 2025-02-08 PROCEDURE — 700111 HCHG RX REV CODE 636 W/ 250 OVERRIDE (IP): Mod: JZ | Performed by: EMERGENCY MEDICINE

## 2025-02-08 PROCEDURE — 96365 THER/PROPH/DIAG IV INF INIT: CPT

## 2025-02-08 PROCEDURE — 81003 URINALYSIS AUTO W/O SCOPE: CPT

## 2025-02-08 PROCEDURE — 85379 FIBRIN DEGRADATION QUANT: CPT

## 2025-02-08 PROCEDURE — 99285 EMERGENCY DEPT VISIT HI MDM: CPT

## 2025-02-08 PROCEDURE — 700117 HCHG RX CONTRAST REV CODE 255

## 2025-02-08 PROCEDURE — 96375 TX/PRO/DX INJ NEW DRUG ADDON: CPT

## 2025-02-08 PROCEDURE — 93005 ELECTROCARDIOGRAM TRACING: CPT | Mod: TC

## 2025-02-08 PROCEDURE — 700117 HCHG RX CONTRAST REV CODE 255: Performed by: EMERGENCY MEDICINE

## 2025-02-08 PROCEDURE — 700111 HCHG RX REV CODE 636 W/ 250 OVERRIDE (IP): Mod: JZ

## 2025-02-08 PROCEDURE — 99222 1ST HOSP IP/OBS MODERATE 55: CPT

## 2025-02-08 PROCEDURE — 85025 COMPLETE CBC W/AUTO DIFF WBC: CPT

## 2025-02-08 PROCEDURE — 93005 ELECTROCARDIOGRAM TRACING: CPT | Mod: TC | Performed by: EMERGENCY MEDICINE

## 2025-02-08 PROCEDURE — 700105 HCHG RX REV CODE 258

## 2025-02-08 PROCEDURE — 83880 ASSAY OF NATRIURETIC PEPTIDE: CPT

## 2025-02-08 PROCEDURE — A9270 NON-COVERED ITEM OR SERVICE: HCPCS

## 2025-02-08 PROCEDURE — 36415 COLL VENOUS BLD VENIPUNCTURE: CPT

## 2025-02-08 PROCEDURE — 71045 X-RAY EXAM CHEST 1 VIEW: CPT

## 2025-02-08 RX ORDER — LEVETIRACETAM 500 MG/1
1000 TABLET ORAL 2 TIMES DAILY
Status: DISCONTINUED | OUTPATIENT
Start: 2025-02-08 | End: 2025-02-09 | Stop reason: HOSPADM

## 2025-02-08 RX ORDER — POTASSIUM CHLORIDE 1500 MG/1
20 TABLET, EXTENDED RELEASE ORAL ONCE
Status: COMPLETED | OUTPATIENT
Start: 2025-02-08 | End: 2025-02-08

## 2025-02-08 RX ORDER — ASPIRIN 81 MG/1
324 TABLET, CHEWABLE ORAL ONCE
Status: DISCONTINUED | OUTPATIENT
Start: 2025-02-08 | End: 2025-02-08

## 2025-02-08 RX ORDER — HYDRALAZINE HYDROCHLORIDE 20 MG/ML
10 INJECTION INTRAMUSCULAR; INTRAVENOUS EVERY 4 HOURS PRN
Status: DISCONTINUED | OUTPATIENT
Start: 2025-02-08 | End: 2025-02-09 | Stop reason: HOSPADM

## 2025-02-08 RX ORDER — MAGNESIUM SULFATE HEPTAHYDRATE 40 MG/ML
2 INJECTION, SOLUTION INTRAVENOUS ONCE
Status: COMPLETED | OUTPATIENT
Start: 2025-02-08 | End: 2025-02-09

## 2025-02-08 RX ORDER — ONDANSETRON 2 MG/ML
4 INJECTION INTRAMUSCULAR; INTRAVENOUS EVERY 4 HOURS PRN
Status: DISCONTINUED | OUTPATIENT
Start: 2025-02-08 | End: 2025-02-09 | Stop reason: HOSPADM

## 2025-02-08 RX ORDER — ASPIRIN 300 MG/1
300 SUPPOSITORY RECTAL ONCE
Status: DISCONTINUED | OUTPATIENT
Start: 2025-02-08 | End: 2025-02-08

## 2025-02-08 RX ORDER — AZITHROMYCIN 500 MG/5ML
500 INJECTION, POWDER, LYOPHILIZED, FOR SOLUTION INTRAVENOUS EVERY 24 HOURS
Status: DISCONTINUED | OUTPATIENT
Start: 2025-02-08 | End: 2025-02-09 | Stop reason: HOSPADM

## 2025-02-08 RX ORDER — NICOTINE 21 MG/24HR
14 PATCH, TRANSDERMAL 24 HOURS TRANSDERMAL
Status: DISCONTINUED | OUTPATIENT
Start: 2025-02-09 | End: 2025-02-09 | Stop reason: HOSPADM

## 2025-02-08 RX ORDER — MORPHINE SULFATE 4 MG/ML
2 INJECTION INTRAVENOUS
Status: DISCONTINUED | OUTPATIENT
Start: 2025-02-08 | End: 2025-02-09 | Stop reason: HOSPADM

## 2025-02-08 RX ORDER — ACETAMINOPHEN 325 MG/1
650 TABLET ORAL EVERY 6 HOURS PRN
Status: DISCONTINUED | OUTPATIENT
Start: 2025-02-08 | End: 2025-02-09 | Stop reason: HOSPADM

## 2025-02-08 RX ORDER — OXYCODONE HYDROCHLORIDE 5 MG/1
5 TABLET ORAL
Status: DISCONTINUED | OUTPATIENT
Start: 2025-02-08 | End: 2025-02-09 | Stop reason: HOSPADM

## 2025-02-08 RX ORDER — ASPIRIN 325 MG
325 TABLET ORAL ONCE
Status: DISCONTINUED | OUTPATIENT
Start: 2025-02-08 | End: 2025-02-08

## 2025-02-08 RX ORDER — REGADENOSON 0.08 MG/ML
0.4 INJECTION, SOLUTION INTRAVENOUS
Status: DISCONTINUED | OUTPATIENT
Start: 2025-02-08 | End: 2025-02-08

## 2025-02-08 RX ORDER — AMINOPHYLLINE 25 MG/ML
100 INJECTION, SOLUTION INTRAVENOUS
Status: DISCONTINUED | OUTPATIENT
Start: 2025-02-08 | End: 2025-02-09 | Stop reason: HOSPADM

## 2025-02-08 RX ORDER — OXYCODONE HYDROCHLORIDE 5 MG/1
2.5 TABLET ORAL
Status: DISCONTINUED | OUTPATIENT
Start: 2025-02-08 | End: 2025-02-09 | Stop reason: HOSPADM

## 2025-02-08 RX ORDER — MORPHINE SULFATE 4 MG/ML
4 INJECTION INTRAVENOUS ONCE
Status: COMPLETED | OUTPATIENT
Start: 2025-02-08 | End: 2025-02-08

## 2025-02-08 RX ORDER — ONDANSETRON 4 MG/1
4 TABLET, ORALLY DISINTEGRATING ORAL EVERY 4 HOURS PRN
Status: DISCONTINUED | OUTPATIENT
Start: 2025-02-08 | End: 2025-02-09 | Stop reason: HOSPADM

## 2025-02-08 RX ORDER — ALUMINA, MAGNESIA, AND SIMETHICONE 2400; 2400; 240 MG/30ML; MG/30ML; MG/30ML
30 SUSPENSION ORAL EVERY 4 HOURS PRN
Status: DISCONTINUED | OUTPATIENT
Start: 2025-02-08 | End: 2025-02-09 | Stop reason: HOSPADM

## 2025-02-08 RX ORDER — LEVETIRACETAM 1000 MG/1
1000 TABLET ORAL 2 TIMES DAILY
COMMUNITY

## 2025-02-08 RX ORDER — ASPIRIN 81 MG/1
81 TABLET ORAL DAILY
Status: DISCONTINUED | OUTPATIENT
Start: 2025-02-09 | End: 2025-02-09 | Stop reason: HOSPADM

## 2025-02-08 RX ADMIN — LEVETIRACETAM 1000 MG: 500 TABLET, FILM COATED ORAL at 20:53

## 2025-02-08 RX ADMIN — IOHEXOL 40 ML: 350 INJECTION, SOLUTION INTRAVENOUS at 19:30

## 2025-02-08 RX ADMIN — POTASSIUM CHLORIDE 20 MEQ: 1500 TABLET, EXTENDED RELEASE ORAL at 20:53

## 2025-02-08 RX ADMIN — AZITHROMYCIN DIHYDRATE 500 MG: 500 INJECTION, POWDER, LYOPHILIZED, FOR SOLUTION INTRAVENOUS at 21:26

## 2025-02-08 RX ADMIN — MAGNESIUM SULFATE HEPTAHYDRATE 2 G: 2 INJECTION, SOLUTION INTRAVENOUS at 22:41

## 2025-02-08 RX ADMIN — IOHEXOL 75 ML: 350 INJECTION, SOLUTION INTRAVENOUS at 19:30

## 2025-02-08 RX ADMIN — MORPHINE SULFATE 4 MG: 4 INJECTION, SOLUTION INTRAMUSCULAR; INTRAVENOUS at 20:43

## 2025-02-08 RX ADMIN — AMPICILLIN AND SULBACTAM 3 G: 1; 2 INJECTION, POWDER, FOR SOLUTION INTRAMUSCULAR; INTRAVENOUS at 20:53

## 2025-02-08 SDOH — ECONOMIC STABILITY: TRANSPORTATION INSECURITY
IN THE PAST 12 MONTHS, HAS THE LACK OF TRANSPORTATION KEPT YOU FROM MEDICAL APPOINTMENTS OR FROM GETTING MEDICATIONS?: NO

## 2025-02-08 SDOH — ECONOMIC STABILITY: TRANSPORTATION INSECURITY
IN THE PAST 12 MONTHS, HAS LACK OF RELIABLE TRANSPORTATION KEPT YOU FROM MEDICAL APPOINTMENTS, MEETINGS, WORK OR FROM GETTING THINGS NEEDED FOR DAILY LIVING?: NO

## 2025-02-08 ASSESSMENT — LIFESTYLE VARIABLES
ON A TYPICAL DAY WHEN YOU DRINK ALCOHOL HOW MANY DRINKS DO YOU HAVE: 2
TOTAL SCORE: 0
CONSUMPTION TOTAL: NEGATIVE
HAVE PEOPLE ANNOYED YOU BY CRITICIZING YOUR DRINKING: NO
HAVE YOU EVER FELT YOU SHOULD CUT DOWN ON YOUR DRINKING: NO
EVER HAD A DRINK FIRST THING IN THE MORNING TO STEADY YOUR NERVES TO GET RID OF A HANGOVER: NO
EVER FELT BAD OR GUILTY ABOUT YOUR DRINKING: NO
TOTAL SCORE: 0
AVERAGE NUMBER OF DAYS PER WEEK YOU HAVE A DRINK CONTAINING ALCOHOL: 2
DOES PATIENT WANT TO STOP DRINKING: NO
HOW MANY TIMES IN THE PAST YEAR HAVE YOU HAD 5 OR MORE DRINKS IN A DAY: 0
ALCOHOL_USE: YES
TOTAL SCORE: 0

## 2025-02-08 ASSESSMENT — COGNITIVE AND FUNCTIONAL STATUS - GENERAL
MOVING TO AND FROM BED TO CHAIR: A LITTLE
SUGGESTED CMS G CODE MODIFIER MOBILITY: CJ
STANDING UP FROM CHAIR USING ARMS: A LITTLE
DAILY ACTIVITIY SCORE: 24
SUGGESTED CMS G CODE MODIFIER DAILY ACTIVITY: CH
MOBILITY SCORE: 22

## 2025-02-08 ASSESSMENT — HEART SCORE
ECG: NON-SPECIFIC REPOLARIZATION DISTURBANCE
HEART SCORE: 6
HISTORY: HIGHLY SUSPICIOUS
TROPONIN: LESS THAN OR EQUAL TO NORMAL LIMIT
RISK FACTORS: 1-2 RISK FACTORS
AGE: 65+

## 2025-02-08 ASSESSMENT — SOCIAL DETERMINANTS OF HEALTH (SDOH)
WITHIN THE LAST YEAR, HAVE YOU BEEN HUMILIATED OR EMOTIONALLY ABUSED IN OTHER WAYS BY YOUR PARTNER OR EX-PARTNER?: NO
WITHIN THE LAST YEAR, HAVE TO BEEN RAPED OR FORCED TO HAVE ANY KIND OF SEXUAL ACTIVITY BY YOUR PARTNER OR EX-PARTNER?: NO
WITHIN THE PAST 12 MONTHS, YOU WORRIED THAT YOUR FOOD WOULD RUN OUT BEFORE YOU GOT THE MONEY TO BUY MORE: NEVER TRUE
WITHIN THE PAST 12 MONTHS, THE FOOD YOU BOUGHT JUST DIDN'T LAST AND YOU DIDN'T HAVE MONEY TO GET MORE: NEVER TRUE
WITHIN THE LAST YEAR, HAVE YOU BEEN KICKED, HIT, SLAPPED, OR OTHERWISE PHYSICALLY HURT BY YOUR PARTNER OR EX-PARTNER?: NO
WITHIN THE LAST YEAR, HAVE YOU BEEN AFRAID OF YOUR PARTNER OR EX-PARTNER?: NO
IN THE PAST 12 MONTHS, HAS THE ELECTRIC, GAS, OIL, OR WATER COMPANY THREATENED TO SHUT OFF SERVICE IN YOUR HOME?: NO

## 2025-02-08 ASSESSMENT — ENCOUNTER SYMPTOMS
NEUROLOGICAL NEGATIVE: 1
GASTROINTESTINAL NEGATIVE: 1
COUGH: 0
SHORTNESS OF BREATH: 0
PSYCHIATRIC NEGATIVE: 1
MUSCULOSKELETAL NEGATIVE: 1
EYES NEGATIVE: 1

## 2025-02-08 ASSESSMENT — PATIENT HEALTH QUESTIONNAIRE - PHQ9
2. FEELING DOWN, DEPRESSED, IRRITABLE, OR HOPELESS: NOT AT ALL
1. LITTLE INTEREST OR PLEASURE IN DOING THINGS: NOT AT ALL
SUM OF ALL RESPONSES TO PHQ9 QUESTIONS 1 AND 2: 0

## 2025-02-08 ASSESSMENT — FIBROSIS 4 INDEX
FIB4 SCORE: 1.5
FIB4 SCORE: 1.69

## 2025-02-08 ASSESSMENT — PAIN DESCRIPTION - PAIN TYPE
TYPE: ACUTE PAIN
TYPE: ACUTE PAIN

## 2025-02-09 ENCOUNTER — PHARMACY VISIT (OUTPATIENT)
Dept: PHARMACY | Facility: MEDICAL CENTER | Age: 70
End: 2025-02-09
Payer: COMMERCIAL

## 2025-02-09 VITALS
SYSTOLIC BLOOD PRESSURE: 107 MMHG | HEIGHT: 65 IN | RESPIRATION RATE: 16 BRPM | HEART RATE: 89 BPM | BODY MASS INDEX: 24.65 KG/M2 | OXYGEN SATURATION: 92 % | WEIGHT: 147.93 LBS | TEMPERATURE: 97.9 F | DIASTOLIC BLOOD PRESSURE: 55 MMHG

## 2025-02-09 LAB
ANION GAP SERPL CALC-SCNC: 14 MMOL/L (ref 7–16)
BUN SERPL-MCNC: 13 MG/DL (ref 8–22)
CALCIUM SERPL-MCNC: 8.7 MG/DL (ref 8.5–10.5)
CHLORIDE SERPL-SCNC: 100 MMOL/L (ref 96–112)
CHOLEST SERPL-MCNC: 147 MG/DL (ref 100–199)
CO2 SERPL-SCNC: 21 MMOL/L (ref 20–33)
CREAT SERPL-MCNC: 0.68 MG/DL (ref 0.5–1.4)
EKG IMPRESSION: NORMAL
ERYTHROCYTE [DISTWIDTH] IN BLOOD BY AUTOMATED COUNT: 53.2 FL (ref 35.9–50)
EST. AVERAGE GLUCOSE BLD GHB EST-MCNC: 111 MG/DL
GFR SERPLBLD CREATININE-BSD FMLA CKD-EPI: 93 ML/MIN/1.73 M 2
GLUCOSE SERPL-MCNC: 82 MG/DL (ref 65–99)
HBA1C MFR BLD: 5.5 % (ref 4–5.6)
HCT VFR BLD AUTO: 42.8 % (ref 37–47)
HDLC SERPL-MCNC: 88 MG/DL
HGB BLD-MCNC: 14.6 G/DL (ref 12–16)
LDLC SERPL CALC-MCNC: 48 MG/DL
MCH RBC QN AUTO: 34.3 PG (ref 27–33)
MCHC RBC AUTO-ENTMCNC: 34.1 G/DL (ref 32.2–35.5)
MCV RBC AUTO: 100.5 FL (ref 81.4–97.8)
PLATELET # BLD AUTO: 223 K/UL (ref 164–446)
PMV BLD AUTO: 10.4 FL (ref 9–12.9)
POTASSIUM SERPL-SCNC: 3.4 MMOL/L (ref 3.6–5.5)
RBC # BLD AUTO: 4.26 M/UL (ref 4.2–5.4)
SODIUM SERPL-SCNC: 135 MMOL/L (ref 135–145)
TRIGL SERPL-MCNC: 55 MG/DL (ref 0–149)
TSH SERPL DL<=0.005 MIU/L-ACNC: 1.96 UIU/ML (ref 0.38–5.33)
WBC # BLD AUTO: 16.1 K/UL (ref 4.8–10.8)

## 2025-02-09 PROCEDURE — 80048 BASIC METABOLIC PNL TOTAL CA: CPT

## 2025-02-09 PROCEDURE — 36415 COLL VENOUS BLD VENIPUNCTURE: CPT

## 2025-02-09 PROCEDURE — 85027 COMPLETE CBC AUTOMATED: CPT

## 2025-02-09 PROCEDURE — 700105 HCHG RX REV CODE 258

## 2025-02-09 PROCEDURE — 80061 LIPID PANEL: CPT

## 2025-02-09 PROCEDURE — 83036 HEMOGLOBIN GLYCOSYLATED A1C: CPT

## 2025-02-09 PROCEDURE — RXMED WILLOW AMBULATORY MEDICATION CHARGE: Performed by: INTERNAL MEDICINE

## 2025-02-09 PROCEDURE — 93010 ELECTROCARDIOGRAM REPORT: CPT | Performed by: INTERNAL MEDICINE

## 2025-02-09 PROCEDURE — 96366 THER/PROPH/DIAG IV INF ADDON: CPT

## 2025-02-09 PROCEDURE — G0378 HOSPITAL OBSERVATION PER HR: HCPCS

## 2025-02-09 PROCEDURE — 700102 HCHG RX REV CODE 250 W/ 637 OVERRIDE(OP)

## 2025-02-09 PROCEDURE — A9270 NON-COVERED ITEM OR SERVICE: HCPCS

## 2025-02-09 PROCEDURE — 99239 HOSP IP/OBS DSCHRG MGMT >30: CPT | Performed by: INTERNAL MEDICINE

## 2025-02-09 PROCEDURE — 700111 HCHG RX REV CODE 636 W/ 250 OVERRIDE (IP): Mod: JZ

## 2025-02-09 PROCEDURE — 84443 ASSAY THYROID STIM HORMONE: CPT

## 2025-02-09 RX ADMIN — ASPIRIN 81 MG: 81 TABLET, COATED ORAL at 05:40

## 2025-02-09 RX ADMIN — ACETAMINOPHEN 650 MG: 325 TABLET ORAL at 15:10

## 2025-02-09 RX ADMIN — OXYCODONE 5 MG: 5 TABLET ORAL at 15:10

## 2025-02-09 RX ADMIN — AMPICILLIN AND SULBACTAM 3 G: 1; 2 INJECTION, POWDER, FOR SOLUTION INTRAMUSCULAR; INTRAVENOUS at 12:15

## 2025-02-09 RX ADMIN — AMPICILLIN AND SULBACTAM 3 G: 1; 2 INJECTION, POWDER, FOR SOLUTION INTRAMUSCULAR; INTRAVENOUS at 03:05

## 2025-02-09 RX ADMIN — ACETAMINOPHEN 650 MG: 325 TABLET ORAL at 04:31

## 2025-02-09 RX ADMIN — LEVETIRACETAM 1000 MG: 500 TABLET, FILM COATED ORAL at 05:40

## 2025-02-09 RX ADMIN — NICOTINE 14 MG: 14 PATCH TRANSDERMAL at 05:41

## 2025-02-09 ASSESSMENT — PAIN DESCRIPTION - PAIN TYPE
TYPE: ACUTE PAIN
TYPE: ACUTE PAIN

## 2025-02-09 NOTE — PROGRESS NOTES
Monitor summary:  Sinus rhythm, sinus tach,  Rate   Rare PVCs  0.16/0.06/0.28    Per monitor tech interpretation.

## 2025-02-09 NOTE — ED TRIAGE NOTES
"Chief Complaint   Patient presents with    Chest Pain     Chest pain starting last night. Pt reports turning in her sleep and felt a tearing pain in her right chest. Persistent pain.     BIB EMS from home. EMS medicated pt with 324 of ASA. Pt reports improvement of symptoms since medication. AAOx4, speaking in full sentences, answers appropriately.    Pulse (!) (P) 103   Temp (P) 37.1 °C (98.8 °F) (Temporal)   Resp (P) 20   Ht (P) 1.651 m (5' 5\")   Wt (P) 69.9 kg (154 lb)   SpO2 (P) 93%   BMI (P) 25.63 kg/m²     "

## 2025-02-09 NOTE — H&P
Hospital Medicine History & Physical Note    Date of Service  2/8/2025    Primary Care Physician  Akash Vilchis M.D.    Code Status  Full Code    Chief Complaint  Chief Complaint   Patient presents with    Chest Pain     Chest pain starting last night. Pt reports turning in her sleep and felt a tearing pain in her right chest. Persistent pain.       History of Presenting Illness  Beatrice Caraballo is a 69 y.o. female who presented 2/8/2025 with chest pain.    This is a 69-year-old female who presents to the emergency department with complaints of chest pain.  She has a past medical history of seizure disorder and is a current everyday smoker.    Patient seen and examined at bedside.  Patient states that she woke this morning from sleep and felt a tearing and burning pain in her right chest.  This pain has remained persistent but is mildly alleviated by lying on her left side.  Pain is reproducible to palpation.  Patient denies any recent illness, fevers, chills, nausea, vomiting or shortness of breath.    In the emergency department, patient is found to have an elevated white blood cell count of 19.5.  CMP demonstrates mild hyponatremia and mild hypokalemia.  Lipase negative.  Troponin negative x 2.  D-dimer elevated at 1.03.  Procalcitonin elevated at 0.37.    Given concerning assessment findings with elevated D-dimer and procalcitonin, I have placed orders for stat CT/CTA PE study and CT/CTA thoracoabdominal aorta.  There is no evidence of pulmonary embolism, pneumonia in the anterior medial right upper lobe, moderate emphysematous changes, lobulated noncalcified right lower lobe nodule.  No aortic aneurysm or dissection identified, hepatic steatosis, liver surface nodular likely indicating cirrhosis.    Patient will be admitted to the observation unit and monitored on telemetry.  I have ordered IV antibiotics for CAP treatment.  Nicotine replacement protocol ordered, patient counseled on cessation.  Follow-up a.m.  labs.    I discussed the plan of care with patient and ERP .    Review of Systems  Review of Systems   Constitutional:  Positive for malaise/fatigue.   HENT: Negative.     Eyes: Negative.    Respiratory:  Negative for cough and shortness of breath.    Cardiovascular:  Positive for chest pain.   Gastrointestinal: Negative.    Genitourinary: Negative.    Musculoskeletal: Negative.    Skin: Negative.    Neurological: Negative.    Endo/Heme/Allergies: Negative.    Psychiatric/Behavioral: Negative.     All other systems reviewed and are negative.      Past Medical History   has a past medical history of Seizure (HCC) and Seizure (HCC).    Surgical History   has a past surgical history that includes arthroplasty.     Family History  family history includes Cancer in her sister.   Family history reviewed with patient. There is no family history that is pertinent to the chief complaint.     Social History   reports that she has been smoking cigarettes. She has never used smokeless tobacco. She reports current alcohol use. She reports current drug use. Drug: Intravenous.    Allergies  No Known Allergies    Medications  Prior to Admission Medications   Prescriptions Last Dose Informant Patient Reported? Taking?   levetiracetam (KEPPRA) 1000 MG tablet 2/8/2025 Morning  Yes Yes   Sig: Take 1,000 mg by mouth 2 times a day.      Facility-Administered Medications: None       Physical Exam  Temp:  [36.7 °C (98 °F)-36.7 °C (98.1 °F)] 36.7 °C (98 °F)  Pulse:  [] 105  Resp:  [20] 20  BP: (122-141)/(59-66) 127/66  SpO2:  [92 %-94 %] 92 %  Blood Pressure : (P) 122/59   Temperature: 36.7 °C (98.1 °F)   Pulse: (P) 93   Respiration: (P) 20   Pulse Oximetry: (P) 94 %       Physical Exam  Vitals reviewed.   Constitutional:       Appearance: She is ill-appearing.   HENT:      Head: Normocephalic.      Nose: Nose normal.      Mouth/Throat:      Mouth: Mucous membranes are moist.   Eyes:      Extraocular Movements: Extraocular movements  intact.      Pupils: Pupils are equal, round, and reactive to light.   Cardiovascular:      Rate and Rhythm: Normal rate and regular rhythm.      Pulses: Normal pulses.      Heart sounds: Normal heart sounds.   Pulmonary:      Effort: Pulmonary effort is normal.      Comments: Diminished  Abdominal:      General: Abdomen is flat. Bowel sounds are normal.      Palpations: Abdomen is soft.   Musculoskeletal:         General: Normal range of motion.      Cervical back: Normal range of motion.   Skin:     General: Skin is warm.      Capillary Refill: Capillary refill takes less than 2 seconds.   Neurological:      General: No focal deficit present.      Mental Status: She is alert and oriented to person, place, and time. Mental status is at baseline.   Psychiatric:         Mood and Affect: Mood normal.         Behavior: Behavior normal.         Thought Content: Thought content normal.         Judgment: Judgment normal.         Laboratory:  Recent Labs     02/08/25  1706   WBC 19.5*   RBC 4.43   HEMOGLOBIN 15.2   HEMATOCRIT 44.2   MCV 99.8*   MCH 34.3*   MCHC 34.4   RDW 52.4*   PLATELETCT 268   MPV 10.1     Recent Labs     02/08/25  1706   SODIUM 133*   POTASSIUM 3.5*   CHLORIDE 99   CO2 20   GLUCOSE 99   BUN 13   CREATININE 0.72   CALCIUM 8.9     Recent Labs     02/08/25  1706 02/08/25  1830   ALTSGPT 52*  --    ASTSGOT 46*  --    ALKPHOSPHAT 72  --    TBILIRUBIN 0.5  --    LIPASE 15 13   GLUCOSE 99  --          Recent Labs     02/08/25  1706   NTPROBNP 1120*         Recent Labs     02/08/25  1706 02/08/25  1830   TROPONINT 8 8       Imaging:  CT-CTA COMPLETE THORACOABDOMINAL AORTA   Final Result      1.  No aortic aneurysm or dissection identified.      2.  Stenosis of origin right renal artery appears to measure at least 70% diameter reduction.      3.  Consolidation right middle pulmonary lobe is identified which could be due to pneumonia.      4.  1.5 cm multi lobulated nodule right lower pulmonary lobe is  identified. Lung carcinoma is possible.      5.  Emphysema.      6.  Hepatic steatosis. Liver surface is also nodular which likely indicates cirrhosis.            Low and High Risk: Consider tissue sampling, PET/CT, or follow-up CT at 3 months      Low Risk - Minimal or absent history of smoking and of other known risk factors.      High Risk - History of smoking or of other known risk factors.      Note: These recommendations do not apply to lung cancer screening, patients with immunosuppression, or patients with known primary cancer.      Fleischner Society 2017 Guidelines for Management of Incidentally Detected Pulmonary Nodules in Adults               CT-CTA CHEST PULMONARY ARTERY W/ RECONS   Final Result         1.  No evidence of pulmonary embolism.   2.  Pneumonia in the anteromedial right upper lobe.   3.  Moderate emphysematous changes.   4.  Lobulated noncalcified right lower lobe nodule is indeterminate but lung malignancy must be excluded. Short-term follow-up evaluation with PET/CT may be performed.      Fleischner Society pulmonary nodule recommendations:   Low and High Risk: Consider CT at 3 months, PET/CT, or tissue sampling      Low Risk - Minimal or absent history of smoking and of other known risk factors.      High Risk - History of smoking or of other known risk factors.      Note: These recommendations do not apply to lung cancer screening, patients with immunosuppression, or patients with known primary cancer.      Fleischner Society 2017 Guidelines for Management of Incidentally Detected Pulmonary Nodules in Adults      DX-CHEST-PORTABLE (1 VIEW)   Final Result         No acute cardiac or pulmonary abnormality is identified.          X-Ray:  I have personally reviewed the images and compared with prior images.  EKG:  I have personally reviewed the images and compared with prior images.    Assessment/Plan:  Justification for Admission Status  I anticipate this patient is appropriate for  observation status at this time because chest pain    Patient will need a Telemetry bed on MEDICAL service .  The need is secondary to chest pain.    * Chest pain- (present on admission)  Assessment & Plan  The ASCVD Risk score (Maddie YANG, et al., 2019) failed to calculate for the following reasons:    Cannot find a previous HDL lab    Cannot find a previous total cholesterol lab    Found to have right sided pneumonia, likely cause of chest pain  Troponin negative x 2  Initial EKG on my read demonstrates sinus rhythm, repeat in 4 hours      Smoker- (present on admission)  Assessment & Plan  Nicotine replacement protocol  Educated on cessation    Pneumonia of right upper lobe due to infectious organism- (present on admission)  Assessment & Plan  Seen on CT  Mildly elevated procalcitonin  IV Unasyn and azithromycin    Seizure disorder (HCC)- (present on admission)  Assessment & Plan  Continue home medication  Seizure precautions        VTE prophylaxis: SCDs/TEDs

## 2025-02-09 NOTE — DISCHARGE PLANNING
1309, JARED AVALOS. informed this RN CM, Pt is needing assistance with transport home    1330, Pt was visited at room. Pt confirmed the home address she is gonobi is [address] [place] [id number]. Cab  Voucher provided to JARED AVALOS. JARED PÉREZ called and spoke to Laverne from [facility] and she confirmed they can service the Pt's address.     1550, JARED PÉREZ called [employer] to set up Uber ride for Pt with Leadership approval. JARED Newton and [employer] were informed through [employer].

## 2025-02-09 NOTE — CARE PLAN
The patient is Stable - Low risk of patient condition declining or worsening    Shift Goals  Clinical Goals: tele monitoring, iv abx, rest  Patient Goals: rest, feel better, go home  Family Goals: no family present    Progress made toward(s) clinical / shift goals:    Problem: Pain - Standard  Goal: Alleviation of pain or a reduction in pain to the patient’s comfort goal  Description: Target End Date:  Prior to discharge or change in level of care    Document on Vitals flowsheet    1.  Document pain using the appropriate pain scale per order or unit policy  2.  Educate and implement non-pharmacologic comfort measures (i.e. relaxation, distraction, massage, cold/heat therapy, etc.)  3.  Pain management medications as ordered  4.  Reassess pain after pain med administration per policy  5.  If opiods administered assess patient's response to pain medication is appropriate per POSS sedation scale  6.  Follow pain management plan developed in collaboration with patient and interdisciplinary team (including palliative care or pain specialists if applicable)  Outcome: Progressing     Problem: Knowledge Deficit - Standard  Goal: Patient and family/care givers will demonstrate understanding of plan of care, disease process/condition, diagnostic tests and medications  Description: Target End Date:  1-3 days or as soon as patient condition allows    Document in Patient Education    1.  Patient and family/caregiver oriented to unit, equipment, visitation policy and means for communicating concern  2.  Complete/review Learning Assessment  3.  Assess knowledge level of disease process/condition, treatment plan, diagnostic tests and medications  4.  Explain disease process/condition, treatment plan, diagnostic tests and medications  Outcome: Progressing     Problem: Respiratory  Goal: Patient will achieve/maintain optimum respiratory ventilation and gas exchange  Description: Target End Date:  Prior to discharge or change in level  of care    Document on Assessment flowsheet    1.  Assess and monitor rate, rhythm, depth and effort of respiration  2.  Breath sounds assessed qshift and/or as needed  3.  Assess O2 saturation, administer/titrate oxygen as ordered  4.  Position patient for maximum ventilatory efficiency  5.  Turn, cough, and deep breath with splinting to improve effectiveness  6.  Collaborate with RT to administer medication/treatments per order  7.  Encourage use of incentive spirometer and encourage patient to cough after use and utilize splinting techniques if applicable  8.  Airway suctioning  9.  Monitor sputum production for changes in color, consistency and frequency  10. Perform frequent oral hygiene  11. Alternate physical activity with rest periods  Outcome: Progressing     Problem: Infection - Standard  Goal: Patient will remain free from infection  Description: Target End Date:  Prior to discharge or change in level of care    1.  Utilize Standard Precautions at all times to reduce the risk of transmission of microorganisms from both recognized and  unrecognized sources of infection  2.  Infection prevention handouts provided (general/device/diagnosis specific) and documented in Patient Education  3.  Educate patient and family/caregiver on isolation precautions if applicable  Outcome: Progressing       Patient is not progressing towards the following goals:

## 2025-02-09 NOTE — PROGRESS NOTES
4 Eyes Skin Assessment Completed by JARED Puckett and JARED Weiss.    Head WDL  Ears WDL  Nose WDL  Mouth WDL  Neck WDL  Breast/Chest WDL  Shoulder Blades WDL  Spine WDL  (R) Arm/Elbow/Hand Blanching, Swelling, and Discoloration  (L) Arm/Elbow/Hand Blanching and Discoloration  Abdomen WDL  Groin WDL  Scrotum/Coccyx/Buttocks WDL  (R) Leg Swelling and Edema  (L) Leg Swelling and Edema  (R) Heel/Foot/Toe Redness, Blanching, Swelling, and Edema  (L) Heel/Foot/Toe Redness, Blanching, Swelling, and Edema      Devices In Places Tele Box, Blood Pressure Cuff, and Pulse Ox      Interventions In Place Pillows    Possible Skin Injury No    Pictures Uploaded Into Epic N/A  Wound Consult Placed N/A  RN Wound Prevention Protocol Ordered No

## 2025-02-09 NOTE — PROGRESS NOTES
Pt escorted downstairs by staff member to be taken home by Uber, set up by CM. Pt has all belongings and her prescription. Her PIV was removed.

## 2025-02-09 NOTE — ASSESSMENT & PLAN NOTE
The ASCVD Risk score (Maddie YANG, et al., 2019) failed to calculate for the following reasons:    Cannot find a previous HDL lab    Cannot find a previous total cholesterol lab    Found to have right sided pneumonia, likely cause of chest pain  Troponin negative x 2  Initial EKG on my read demonstrates sinus rhythm, repeat in 4 hours

## 2025-02-09 NOTE — ED NOTES
Med rec updated and complete.  Allergies reviewed.      Pt confirmed name and date of birth.    Pt denies antibiotic use in last 30 days.    No anticoagulant or antiplatelet medications.      Preferred Pharmacy  [facility] = [phone]

## 2025-02-09 NOTE — ED PROVIDER NOTES
"ED Provider Note    CHIEF COMPLAINT  Chief Complaint   Patient presents with    Chest Pain     Chest pain starting last night. Pt reports turning in her sleep and felt a tearing pain in her right chest. Persistent pain.       EXTERNAL RECORDS REVIEWED  Outpatient Notes the patient was seen at primary care doctor's office on October 31, 2024 with dental pain    HPI/ROS  LIMITATION TO HISTORY   Select: : None  OUTSIDE HISTORIAN(S):  None with past medical history seen for seizure disorder, she    Beatrice Caraballo is a 69 y.o. female who presents reports that last night she had burning in the right chest that she has never had before.  She denies any palpitations, no shortness of breath, no leg swelling or hemoptysis.  She has never had this type of pain in her chest before.    PAST MEDICAL HISTORY   has a past medical history of Seizure (HCC) and Seizure (HCC).    SURGICAL HISTORY   has a past surgical history that includes arthroplasty.    FAMILY HISTORY  Family History   Problem Relation Age of Onset    Cancer Sister        SOCIAL HISTORY  Social History     Tobacco Use    Smoking status: Every Day     Current packs/day: 0.50     Types: Cigarettes    Smokeless tobacco: Never    Tobacco comments:     Patient states a pack lasts her 3 to 4 days   Vaping Use    Vaping status: Never Used   Substance and Sexual Activity    Alcohol use: Yes     Comment: \"1-2 drinks/month\"    Drug use: Yes     Types: Intravenous     Comment: MJ    Sexual activity: Not on file       CURRENT MEDICATIONS  Home Medications       Reviewed by Humberto Ramirez, Student (Nurse Apprentice) on 02/08/25 at 1657  Med List Status: <None>     Medication Last Dose Status   clindamycin (CLEOCIN) 300 MG Cap  Active   ibuprofen (MOTRIN) 800 MG Tab  Active                         ALLERGIES  No Known Allergies    PHYSICAL EXAM  VITAL SIGNS: BP (P) 122/59   Pulse (P) 93   Temp 36.7 °C (98.1 °F) (Temporal)   Resp (P) 20   Ht 1.651 m (5' 5\")   Wt 69.9 kg " (154 lb)   SpO2 (P) 94%   BMI 25.63 kg/m²      Constitutional: Alert.  HENT: No signs of trauma, Bilateral external ears normal, Nose normal.   Eyes: Pupils are equal and reactive, Conjunctiva normal, Non-icteric.   Neck: Normal range of motion, No tenderness, Supple, No stridor.   Lymphatic: No lymphadenopathy noted.   Cardiovascular: Irregularly irregular with no murmurs.  Thorax & Lungs: Normal breath sounds, No respiratory distress, No wheezing, No chest tenderness.   Abdomen: Bowel sounds normal, Soft, No tenderness, No peritoneal signs, No masses, No pulsatile masses.   Skin: Warm, Dry, No erythema, No rash.   Back: No bony tenderness, No CVA tenderness.   Extremities: Intact distal pulses, No edema, No tenderness, No cyanosis.  Musculoskeletal: Good range of motion in all major joints. No tenderness to palpation or major deformities noted.   Neurologic: Alert , Normal motor function, Normal sensory function, No focal deficits noted.   Psychiatric: Affect normal, Judgment normal, Mood normal.       EKG/LABS    Atrial flutter rate 98  axis LEFT AXIS DEVIATION -51 degrees  intervals normal  Inferior infarct, old  Probable anterior infarct, age indeterminate  No previous ECG available for comparison  My impression of this EKG, nonspecific changes, cannot not ischemia, does not meet STEMI criteria at this time  I have independently interpreted this EKG    RADIOLOGY/PROCEDURES   I have independently interpreted the diagnostic imaging associated with this visit and am waiting the final reading from the radiologist.   My preliminary interpretation is as follows: Chest x-ray negative for acute disease.    Radiologist interpretation:  DX-CHEST-PORTABLE (1 VIEW)   Final Result         No acute cardiac or pulmonary abnormality is identified.      CT-CTA CHEST PULMONARY ARTERY W/ RECONS    (Results Pending)   EC-ECHOCARDIOGRAM COMPLETE W/O CONT    (Results Pending)   NM-CARDIAC STRESS TEST    (Results Pending)        COURSE & MEDICAL DECISION MAKING    ASSESSMENT, COURSE AND PLAN  Care Narrative: Patient presents with chest pain that is burning in nature on the right side.  She has never had symptoms like this before.  EKG was ordered, labs ordered, chest x-ray was ordered.    The patient's troponin is normal.  Her D-dimer is slightly elevated, her BNP is slightly elevated 1120.  Her white count is elevated 19.5.    I spoke with the hospitalist in the CDU and we agree with the elevated D-dimer and atypical story a CT scan has been ordered to evaluate for pulmonary embolism.  That result is pending at this time and will be followed by the hospitalist.    CHEST PAIN:   HEART Score for Major Cardiac Events  HEART Score     History: Highly suspicious  ECG: Non-specific repolarization disturbance  Age: 65+  Risk Factors: 1-2 risk factors  Troponin: Less than or equal to normal limit    Heart Score: 6    Total Score   0-3 Points = Low Score, risk of MACE 0.9-1.7%.  4-6 Points = Moderate Score, risk of MACE 12-16.6%  7-10 Points = High Score, risk of MACE 50-65%          ADDITIONAL PROBLEMS MANAGED  Leukocytosis, chest pain, A-fib flutter    DISPOSITION AND DISCUSSIONS  I have discussed management of the patient with the following physicians and SHORTY's: I spoke with the [facility] hospitalist to assess the patient for hospitalization.    Discussion of management with other [facility] or appropriate source(s): None     Escalation of care considered, and ultimately not performed: Patient's EKG does not demonstrate STEMI and therefore we do not need to rush her to the Cath Lab.    Barriers to care at this time, including but not limited to:  None .     Decision tools and prescription drugs considered including, but not limited to:  Heart score 6 low .    FINAL DIAGNOSIS  1. Acute chest pain    2. Atrial flutter, unspecified type (HCC)         Electronically signed by: Jong Corona M.D., 2/8/2025 5:22 PM

## 2025-02-09 NOTE — DISCHARGE SUMMARY
Discharge Summary    CHIEF COMPLAINT ON ADMISSION  Chief Complaint   Patient presents with    Chest Pain     Chest pain starting last night. Pt reports turning in her sleep and felt a tearing pain in her right chest. Persistent pain.       Reason for Admission  ems     Admission Date  2/8/2025    CODE STATUS  Full Code    HPI & HOSPITAL COURSE  This is a 69 y.o. female here with ongoing tobacco abuse, seizure disorder who presented hospital chest pain.  She was found on CTA chest to have no pulm embolism but a significant right-sided pneumonia which is likely the etiology of her chest pain.  Additionally on CTA chest she was found to have a lobulated noncalcified right lower lobe nodule that is 1.5 cm in size and moderate probability of possible malignancy.  Patient was admitted to the observation and placed on empiric IV antibiotics and had improvement in her symptoms and was weaned to room air.  Her viral PCR panel was negative.  She likely has community-acquired pneumonia and therefore will be discharged on antibiotics as outlined below.  I also explained to the patient that given ongoing tobacco abuse and somewhat concerning appearing nodule that I placed an intake oncology coordinator consultation for an outpatient IR guided pulmonary biopsy.  If they deem she does not need a biopsy she likely will benefit from surveillance imaging of every 6 month CT scans.  She understands this.  We also had a long discussion about abstaining from tobacco abuse and she we will try to do that.    Therefore, she is discharged in good and stable condition to home with close outpatient follow-up.    Discharge Date  2/9/2025    FOLLOW UP ITEMS POST DISCHARGE  F/U with PCP in 1-2 weeks for post admission follow up  IOC consult placed for pulm nodule, I also placed referral to [facility] pulmonary nodule clinic as well    DISCHARGE DIAGNOSES  Principal Problem:    Chest pain (POA: Yes)  Active Problems:    Seizure disorder (HCC) (POA:  Yes)    Pneumonia of right upper lobe due to infectious organism (POA: Yes)    Smoker (POA: Yes)  Resolved Problems:    Leukocytosis (POA: Unknown)      FOLLOW UP  No future appointments.  Akash Vilchis M.D.  [address] [facility] [address]  [phone]    Follow up in 1 week(s)        MEDICATIONS ON DISCHARGE     Medication List        START taking these medications        Instructions   amoxicillin-clavulanate 875-125 MG Tabs  Commonly known as: Augmentin   Take 1 Tablet by mouth 2 times a day for 6 days.  Dose: 1 Tablet            CONTINUE taking these medications        Instructions   levetiracetam 1000 MG tablet  Commonly known as: Keppra   Take 1,000 mg by mouth 2 times a day.  Dose: 1,000 mg              Allergies  No Known Allergies    DIET  Orders Placed This Encounter   Procedures    Diet Order Diet: Cardiac     Standing Status:   Standing     Number of Occurrences:   1     Order Specific Question:   Diet:     Answer:   Cardiac [6]       ACTIVITY  As tolerated.  Weight bearing as tolerated    CONSULTATIONS  NONE    PROCEDURES  CT-CTA COMPLETE THORACOABDOMINAL AORTA   Final Result      1.  No aortic aneurysm or dissection identified.      2.  Stenosis of origin right renal artery appears to measure at least 70% diameter reduction.      3.  Consolidation right middle pulmonary lobe is identified which could be due to pneumonia.      4.  1.5 cm multi lobulated nodule right lower pulmonary lobe is identified. Lung carcinoma is possible.      5.  Emphysema.      6.  Hepatic steatosis. Liver surface is also nodular which likely indicates cirrhosis.            Low and High Risk: Consider tissue sampling, PET/CT, or follow-up CT at 3 months      Low Risk - Minimal or absent history of smoking and of other known risk factors.      High Risk - History of smoking or of other known risk factors.      Note: These recommendations do not apply to lung cancer screening, patients with immunosuppression, or patients  with known primary cancer.      Fleischner Society 2017 Guidelines for Management of Incidentally Detected Pulmonary Nodules in Adults               CT-CTA CHEST PULMONARY ARTERY W/ RECONS   Final Result         1.  No evidence of pulmonary embolism.   2.  Pneumonia in the anteromedial right upper lobe.   3.  Moderate emphysematous changes.   4.  Lobulated noncalcified right lower lobe nodule is indeterminate but lung malignancy must be excluded. Short-term follow-up evaluation with PET/CT may be performed.      Fleischner Society pulmonary nodule recommendations:   Low and High Risk: Consider CT at 3 months, PET/CT, or tissue sampling      Low Risk - Minimal or absent history of smoking and of other known risk factors.      High Risk - History of smoking or of other known risk factors.      Note: These recommendations do not apply to lung cancer screening, patients with immunosuppression, or patients with known primary cancer.      Fleischner Society 2017 Guidelines for Management of Incidentally Detected Pulmonary Nodules in Adults      DX-CHEST-PORTABLE (1 VIEW)   Final Result         No acute cardiac or pulmonary abnormality is identified.            LABORATORY  Lab Results   Component Value Date    SODIUM 135 02/09/2025    POTASSIUM 3.4 (L) 02/09/2025    CHLORIDE 100 02/09/2025    CO2 21 02/09/2025    GLUCOSE 82 02/09/2025    BUN 13 02/09/2025    CREATININE 0.68 02/09/2025        Lab Results   Component Value Date    WBC 16.1 (H) 02/09/2025    HEMOGLOBIN 14.6 02/09/2025    HEMATOCRIT 42.8 02/09/2025    PLATELETCT 223 02/09/2025        Total time of the discharge process exceeds 43 minutes.

## 2025-02-09 NOTE — PROGRESS NOTES
1900-Report received from kelly Bell RN. Pt will go to CT from ED prior to coming to floor.     2019- Pt arrived to unit via wheelchair at 2019. Pt oriented to room, unit, and plan of care. Tele-monitor placed and monitor room notified. Pt reports 7/10 chest pain that is worse with ambulation and deep breathing. All questions answered at this time. Call light within reach; fall precautions in place.

## 2025-02-09 NOTE — PROGRESS NOTES
Cab voucher obtained from . Pt requested shower prior to DC. She is in the shower now and plan to dc following.

## 2025-02-09 NOTE — CARE PLAN
The patient is Stable - Low risk of patient condition declining or worsening    Shift Goals  Clinical Goals: tele, IV abx, electrolyte replacement  Patient Goals: rest, go home, feel better  Family Goals: HUSEYIN    Progress made toward(s) clinical / shift goals:  Chest pain improved with pain medication, WBCs decreased with antibiotics, pt effectively using incentive spirometer to help cough and deep breathe.     Patient is not progressing towards the following goals: Potassium still low despite replacement due to pt not tolerating large pill.     Problem: Respiratory  Goal: Patient will achieve/maintain optimum respiratory ventilation and gas exchange  Description: Target End Date:  02/10/25    1.  Assess and monitor rate, rhythm, depth and effort of respiration  2.  Breath sounds assessed qshift and/or as needed  3.  Assess O2 saturation, administer/titrate oxygen as ordered  4.  Position patient for maximum ventilatory efficiency  5.  Turn, cough, and deep breath with splinting to improve effectiveness  6.  Collaborate with RT to administer medication/treatments per order  7.  Encourage use of incentive spirometer and encourage patient to cough after use  8.  Airway suctioning  9.  Monitor sputum production for changes in color, consistency and frequency  10. Perform frequent oral hygiene  11. Alternate physical activity with rest periods  Outcome: Progressing     Problem: Infection - Standard  Goal: Patient will remain free from infection  Description: Target End Date:  02/10/25    1.  Utilize Standard Precautions at all times to reduce the risk of transmission of microorganisms from both recognized and  unrecognized sources of infection  2.  Infection prevention handouts provided (general/device/diagnosis specific) and documented in Patient Education  3.  Educate patient and family/caregiver on isolation precautions if applicable  Outcome: Progressing     Problem: Pain - Standard  Goal: Alleviation of pain or a  reduction in pain to the patient’s comfort goal  Description: Target End Date:  02/10/25    1.  Document pain using the appropriate pain scale per order or unit policy  2.  Educate and implement non-pharmacologic comfort measures (i.e. relaxation, distraction, cold/heat therapy, etc.)  3.  Pain management medications as ordered  4.  Reassess pain after pain med administration per policy  5.  If opioids administered assess patient's response to pain medication is appropriate per POSS sedation scale  Outcome: Progressing    Problem: Knowledge Deficit - Standard  Goal: Patient will demonstrate understanding of plan of care, disease process/condition, diagnostic tests and medications  Description: Target End Date: 02/10/25    1.  Patient oriented to unit, equipment, visitation policy and means for communicating concern  2.  Complete/review Learning Assessment  3.  Assess knowledge level of disease process/condition, treatment plan, diagnostic tests and medications  4.  Explain disease process/condition, treatment plan, diagnostic tests and medications  Outcome: Progressing

## 2025-02-18 ENCOUNTER — APPOINTMENT (OUTPATIENT)
Dept: SLEEP MEDICINE | Facility: MEDICAL CENTER | Age: 70
End: 2025-02-18
Attending: INTERNAL MEDICINE
Payer: MEDICARE

## 2025-02-19 DIAGNOSIS — G40.909 SEIZURE DISORDER (HCC): ICD-10-CM

## 2025-02-20 NOTE — TELEPHONE ENCOUNTER
Received request via: Pharmacy    Medication Name/Dosage levetiracetam 500 mg tablet    When was medication last prescribed 01/23/2024    How many refills were previously provided 1    How many Refills does he patient have left from last prescription 0    Was the patient seen in the last year in this department? Yes   Date of last office visit 03/08/2024     Per last Neurology Office Visit, when was the date of next follow up visit set for?                            Date of office visit follow up request 06/08/2024     Does the patient have an upcoming appointment? Yes   If yes, when 03/07/2025             If no, schedule appointment 0    Does the patient have FPC Plus and need 100 day supply (blood pressure, diabetes and cholesterol meds only)? Patient does not have SCP

## 2025-02-21 RX ORDER — LEVETIRACETAM 500 MG/1
1000 TABLET ORAL EVERY 12 HOURS
Qty: 360 TABLET | Refills: 3 | Status: SHIPPED | OUTPATIENT
Start: 2025-02-21 | End: 2026-02-16

## 2025-03-07 ENCOUNTER — APPOINTMENT (OUTPATIENT)
Dept: NEUROLOGY | Facility: MEDICAL CENTER | Age: 70
End: 2025-03-07
Attending: STUDENT IN AN ORGANIZED HEALTH CARE EDUCATION/TRAINING PROGRAM
Payer: MEDICARE

## 2025-05-16 ENCOUNTER — APPOINTMENT (OUTPATIENT)
Dept: NEUROLOGY | Facility: MEDICAL CENTER | Age: 70
End: 2025-05-16
Attending: STUDENT IN AN ORGANIZED HEALTH CARE EDUCATION/TRAINING PROGRAM
Payer: MEDICARE